# Patient Record
Sex: FEMALE | URBAN - METROPOLITAN AREA
[De-identification: names, ages, dates, MRNs, and addresses within clinical notes are randomized per-mention and may not be internally consistent; named-entity substitution may affect disease eponyms.]

---

## 2020-12-03 ENCOUNTER — VIRTUAL VISIT (OUTPATIENT)
Dept: FAMILY MEDICINE CLINIC | Age: 45
End: 2020-12-03
Payer: MEDICARE

## 2020-12-03 DIAGNOSIS — F51.01 PRIMARY INSOMNIA: ICD-10-CM

## 2020-12-03 DIAGNOSIS — J02.9 SORE THROAT: Primary | ICD-10-CM

## 2020-12-03 DIAGNOSIS — Z94.0 KIDNEY TRANSPLANT RECIPIENT: ICD-10-CM

## 2020-12-03 DIAGNOSIS — G89.29 OTHER CHRONIC PAIN: ICD-10-CM

## 2020-12-03 DIAGNOSIS — Z76.89 ENCOUNTER TO ESTABLISH CARE: ICD-10-CM

## 2020-12-03 DIAGNOSIS — F33.0 MILD EPISODE OF RECURRENT MAJOR DEPRESSIVE DISORDER (HCC): ICD-10-CM

## 2020-12-03 DIAGNOSIS — N31.9 NEUROGENIC BLADDER: ICD-10-CM

## 2020-12-03 PROCEDURE — 99203 OFFICE O/P NEW LOW 30 MIN: CPT | Performed by: NURSE PRACTITIONER

## 2020-12-03 PROCEDURE — G0463 HOSPITAL OUTPT CLINIC VISIT: HCPCS | Performed by: NURSE PRACTITIONER

## 2020-12-03 RX ORDER — DULOXETIN HYDROCHLORIDE 60 MG/1
120 CAPSULE, DELAYED RELEASE ORAL
COMMUNITY
Start: 2020-11-16

## 2020-12-03 RX ORDER — OMEPRAZOLE 20 MG/1
CAPSULE, DELAYED RELEASE ORAL
COMMUNITY
Start: 2020-09-25

## 2020-12-03 RX ORDER — ATORVASTATIN CALCIUM 10 MG/1
20 TABLET, FILM COATED ORAL
COMMUNITY
Start: 2020-10-19

## 2020-12-03 RX ORDER — TRAZODONE HYDROCHLORIDE 100 MG/1
12.5 TABLET ORAL
COMMUNITY
End: 2022-11-03

## 2020-12-03 RX ORDER — METHENAMINE HIPPURATE 1000 MG/1
1 TABLET ORAL DAILY
COMMUNITY
End: 2020-12-03 | Stop reason: SDUPTHER

## 2020-12-03 RX ORDER — TIZANIDINE 2 MG/1
TABLET ORAL
COMMUNITY
Start: 2020-10-31

## 2020-12-03 RX ORDER — MYCOPHENOLATE MOFETIL 250 MG/1
750 CAPSULE ORAL DAILY
COMMUNITY

## 2020-12-03 RX ORDER — PREDNISONE 5 MG/1
40 TABLET ORAL
COMMUNITY
Start: 2020-10-13

## 2020-12-03 RX ORDER — GABAPENTIN 300 MG/1
CAPSULE ORAL
COMMUNITY
Start: 2020-11-23

## 2020-12-03 RX ORDER — BUSPIRONE HYDROCHLORIDE 10 MG/1
10 TABLET ORAL AS NEEDED
COMMUNITY

## 2020-12-03 RX ORDER — DULOXETIN HYDROCHLORIDE 30 MG/1
CAPSULE, DELAYED RELEASE ORAL
COMMUNITY
End: 2020-12-03

## 2020-12-03 RX ORDER — MYCOPHENOLATE MOFETIL 500 MG/1
750 TABLET ORAL 2 TIMES DAILY
COMMUNITY
End: 2020-12-03

## 2020-12-03 RX ORDER — CHOLECALCIFEROL (VITAMIN D3) 125 MCG
5000 CAPSULE ORAL DAILY
COMMUNITY

## 2020-12-03 RX ORDER — OXYBUTYNIN CHLORIDE 5 MG/1
TABLET ORAL
COMMUNITY

## 2020-12-03 RX ORDER — METHENAMINE HIPPURATE 1000 MG/1
1 TABLET ORAL DAILY
Qty: 30 TAB | Refills: 1 | Status: SHIPPED | OUTPATIENT
Start: 2020-12-03

## 2020-12-03 RX ORDER — CYCLOSPORINE 100 MG/1
CAPSULE, GELATIN COATED ORAL
COMMUNITY
End: 2021-06-25

## 2020-12-03 RX ORDER — FOLIC ACID 1 MG/1
TABLET ORAL
COMMUNITY

## 2020-12-03 NOTE — PROGRESS NOTES
Chief Complaint   Patient presents with    New Patient    Sore Throat     x 1 week- white spoke- has had strep 2 x this year already        1. Have you been to the ER, urgent care clinic since your last visit? Hospitalized since your last visit? No    2. Have you seen or consulted any other health care providers outside of the [address] [place] [address] since your last visit? Include any pap smears or colon screening.  Appt trevor for Nephrology

## 2020-12-03 NOTE — PROGRESS NOTES
[address] [place] [address]               [phone]      Ru Nava is a 39 y.o. female who was seen by synchronous (real-time) audio-video technology on 12/3/2020. Consent: Ru Nava, who was seen by synchronous (real-time) audio-video technology, and/or her healthcare decision maker, is aware that this patient-initiated, Telehealth encounter on 12/3/2020 is a billable service, with coverage as determined by her insurance carrier. She is aware that she may receive a bill and has provided verbal consent to proceed: Yes. Assessment & Plan:   Diagnoses and all orders for this visit:    1. Sore throat  She endorses a sore throat, states that she has had strep throat twice already this year  Although I was able to vaguely see her tonsils on this video visit and there was a slight white spot I did not feel as though I could absolutely diagnose her with strep throat under the circumstances with her reported symptoms  Although I was willing to go ahead and treat her with penicillin p.o. she stated she would rather have a shot therefore she will go to an urgent care for further evaluation    2. Kidney transplant recipient  She received a kidney transplant in 2006, she has not yet established care with a new nephrologist    3. Other chronic pain  She has back pain related to her congenital spina bifida resulting in her having 1 leg 2 inches shorter than the other    4. Neurogenic bladder  -     methenamine hippurate (HIPREX) 1 gram tablet; Take 1 Tab by mouth daily. She has a neurogenic bladder related to her spina bifida, endorses straight cathing up to 5 times a day, states that this time she has no need for supplies    5. Mild episode of recurrent major depressive disorder [facility])  She has a history of depression she is currently managed through the [address] [place] [address]    6.  Primary insomnia  Endorses chronic insomnia which she will take trazodone for as needed, this is prescribed through the [address]    7. Encounter to establish care  Visit today to establish care, she recently moved here from [place] where her prior PCP was    Follow-up and Dispositions    · Return in about 3 months (around 3/3/2021) for HLD, HTN, 30 min, office only. 712  Subjective:   Yazan Ross is a 39 y.o. female who was seen for   New Patient; Sore Throat (x 1 week- white spoke- has had strep 2 x this year already ); and Annual Wellness Visit (is due)  Moved to this area in May 2020, used to live in [place]  She wanted out of the [place] area and wanted to be near the water and [employer] facility  Lives with her parents, has a god brother-he donated her his kidney    Sore throat  Onset: 1 week ago  Characteristics: can see white spots, her throat feels dry and looks swollen, no redness, swallows without difficulty, has sinus drainage that makes her nauseous. Denies fever or chills, slight difficulty swallowing. White spots are only on her throat  PMH: has had strep throat two times already this year    Hypertension:   Patient reports taking medications as instructed. N/A   Medication side effects noted. N/A  Headache upon wakening. N/A   Home BP monitoring in range of 139/96 at ED 9/2020    Do you experience chest pain/pressure or SOB with exertion? no  Maintain a low salt diet? yes  Key CAD CHF Meds             atorvastatin (LIPITOR) 10 mg tablet (Taking) TK 1 T PO HS            Prior to Admission medications    Medication Sig Start Date End Date Taking?  Authorizing Provider   cycloSPORINE (SandIMMUNE) 100 mg capsule cyclosporine 100 mg capsule   Yes Provider, Historical   gabapentin (NEURONTIN) 300 mg capsule TAKE 1 C PO QD 11/23/20  Yes Provider, Historical   oxybutynin (DITROPAN) 5 mg tablet oxybutynin chloride 5 mg tablet   TK 1 T PO TID   Yes Provider, Historical   omeprazole (PRILOSEC) 20 mg capsule TK 1 C PO D 9/25/20  Yes Provider, Historical cholecalciferol (VITAMIN D3) (5000 Units /125 mcg) capsule Take 5,000 Units by mouth daily. Yes Provider, Historical   atorvastatin (LIPITOR) 10 mg tablet TK 1 T PO HS 10/19/20  Yes Provider, Historical   busPIRone (BUSPAR) 10 mg tablet buspirone 10 mg tablet   Yes Provider, Historical   predniSONE (DELTASONE) 5 mg tablet TK 1 T PO D 10/13/20  Yes Provider, Historical   tiZANidine (ZANAFLEX) 2 mg tablet TK 1 T PO HS PRF MUSCLE SPASM 10/31/20  Yes Provider, Historical   traZODone (DESYREL) 100 mg tablet trazodone 100 mg tablet   Yes Provider, Historical   folic acid (FOLVITE) 1 mg tablet folic acid 1 mg tablet   TK 1 T PO D   Yes Provider, Historical   DULoxetine (CYMBALTA) 60 mg capsule Take 120 mg by mouth nightly. 11/16/20  Yes Provider, Historical   mycophenolate mofetil (CellCept) 250 mg capsule Take 750 mg by mouth daily. Yes Provider, Historical   methenamine hippurate (HIPREX) 1 gram tablet Take 1 Tab by mouth daily. 12/3/20  Yes Buffy Abbott, MILVIA   mycophenolate (CELLCEPT) 500 mg tablet Take 750 mg by mouth two (2) times a day. 12/3/20  Provider, Historical   DULoxetine (CYMBALTA) 30 mg capsule duloxetine 30 mg capsule,delayed release   Take 1 capsule twice a day by oral route as directed for 30 days. 12/3/20  Provider, Historical   methenamine hippurate (HIPREX) 1 gram tablet Take 1 g by mouth daily.   12/3/20  Provider, Historical     No Known Allergies    Patient Active Problem List   Diagnosis Code    Kidney transplant recipient Z94.0    Chronic pain G89.29    Hypercholesterolemia E78.00    Hypertension I10    History of spina bifida Z87.728    Edentulous K80.80    Mild episode of recurrent major depressive disorder ([facility] [address] F33.0    Primary insomnia F51.01     Past Surgical History:   Procedure Laterality Date    HX ORTHOPAEDIC      left ankle    HX TRANSPLANT      kidney    HX UROLOGICAL      ureter implants     Family History   Problem Relation Age of Onset    Diabetes Mother  Lung Disease Mother     COPD Mother     Diabetes Father     Hypertension Father      Social History     Tobacco Use    Smoking status: Never Smoker   Substance Use Topics    Alcohol use: Yes       Review of Systems   Respiratory: Negative. Cardiovascular:        Last b/p   Gastrointestinal: Negative. Genitourinary:        Neurogenic bladder, she self caths, 3-5 times a day   Musculoskeletal:        Left leg 2 inches shorter, uses walker and has a scooter   Psychiatric/Behavioral: Positive for depression. Negative for substance abuse and suicidal ideas. The patient has insomnia. As stated in HPI, otherwise all others negative. Objective: There were no vitals taken for this visit. General: alert, cooperative, no distress   Mental  status: normal mood, behavior, speech, dress, motor activity, and thought processes, able to follow commands   HENT: NCAT   Neck: no visualized mass   Resp: no respiratory distress   Neuro: no gross deficits   Skin: no discoloration or lesions of concern on visible areas   Psychiatric: normal affect, consistent with stated mood, no evidence of hallucinations     Additional exam findings: able to visualize a white lesion on her tonsils      We discussed the expected course, resolution and complications of the diagnosis(es) in detail. Medication risks, benefits, costs, interactions, and alternatives were discussed as indicated. I advised her to contact the office if her condition worsens, changes or fails to improve as anticipated. She expressed understanding with the diagnosis(es) and plan. Cherie Luciano is a 39 y.o. female who was evaluated by a video visit encounter for concerns as above. Patient identification was verified prior to start of the visit. A caregiver was present when appropriate.  Due to this being a TeleHealth encounter (During [place] [address] public [place] emergency), evaluation of the following organ systems was limited: Vitals/Constitutional/EENT/Resp/CV/GI//MS/Neuro/Skin/Heme-Lymph-Imm. Pursuant to the emergency declaration under the [facility], [facility] waiver authority and the [employer] Act, this Virtual  Visit was conducted, with patient's (and/or legal guardian's) consent, to reduce the patient's risk of exposure to COVID-19 and provide necessary medical care. Services were provided through a video synchronous discussion virtually to substitute for in-person clinic visit. Patient and provider were located at their individual homes. An After Visit Summary was printed and given to the patient. All diagnosis have been discussed with the patient and all of the patient's questions have been answered. Follow-up and Dispositions    · Return in about 3 months (around 3/3/2021) for HLD, HTN, 30 min, office only. Jessica Bojorquez, [place] [id number] [address]   Ashely Cochran [address]

## 2021-03-16 ENCOUNTER — PATIENT OUTREACH (OUTPATIENT)
Dept: CASE MANAGEMENT | Age: 46
End: 2021-03-16

## 2021-03-16 RX ORDER — TACROLIMUS 1 MG/1
2 TABLET, EXTENDED RELEASE ORAL DAILY
COMMUNITY
End: 2021-06-30

## 2021-03-16 NOTE — PROGRESS NOTES
Complex Case Management      Date/Time:  3/16/2021 4:26 PM    Method of communication with patient:phone    [facility] ([facility]) contacted the patient by telephone to perform Ambulatory Care Coordination. Verified name and  (PHI) with patient as identifiers. Provided introduction to self, and explanation of the Ambulatory Care Manager's role. Reviewed most recent clinic visit w/ patient who verbalized understanding. Patient given an opportunity to ask questions. Top Challenges reviewed with the patient   1. Has been unable to contact pain management for an appointment - states she will call again today. [facility] has offered to call office if she has difficulty contacting anyone. 2. States she needs some assistance with Medicaid. Is agreeable to speaking to . [facility] will contact . The patient agrees to contact the PCP office or the [facility] for questions related to their healthcare. Provided contact information for future reference. Disease Specific:   N/A    Home Health Active: No    DME Active: yes, supplies to straight cath    Barriers to care? Utilization of services, support system     States that she is mother's primary caregiver (bilateral amputee), lives in home with both parents    Advance Care Planning:   Does patient have an Advance Directive:  not on file; education provided     Medication(s):   Medication reconciliation was performed with patient, who verbalizes understanding of administration of home medications. There were no barriers to obtaining medications identified at this time. States that she is having some GI issues since beginning Envarsus. Referral to Pharm D needed: no     Current Outpatient Medications   Medication Sig    tacrolimus (Envarsus XR) 1 mg Tb24 Take 2 mg by mouth daily.  Indications: prevent kidney transplant rejection    gabapentin (NEURONTIN) 300 mg capsule TAKE 1 C PO QD    oxybutynin (DITROPAN) 5 mg tablet oxybutynin chloride 5 mg tablet   TK 1 T PO TID    omeprazole (PRILOSEC) 20 mg capsule TK 1 C PO D    atorvastatin (LIPITOR) 10 mg tablet TK 1 T PO HS    busPIRone (BUSPAR) 10 mg tablet 10 mg as needed.  predniSONE (DELTASONE) 5 mg tablet TK 1 T PO D    tiZANidine (ZANAFLEX) 2 mg tablet TK 1 T PO HS PRF MUSCLE SPASM    traZODone (DESYREL) 100 mg tablet 25 mg nightly as needed.  folic acid (FOLVITE) 1 mg tablet folic acid 1 mg tablet   TK 1 T PO D    DULoxetine (CYMBALTA) 60 mg capsule Take 120 mg by mouth nightly.  mycophenolate mofetil (CellCept) 250 mg capsule Take 750 mg by mouth daily.  methenamine hippurate (HIPREX) 1 gram tablet Take 1 Tab by mouth daily.  cycloSPORINE (SandIMMUNE) 100 mg capsule cyclosporine 100 mg capsule    cholecalciferol (VITAMIN D3) (5000 Units /125 mcg) capsule Take 5,000 Units by mouth daily. No current facility-administered medications for this visit. [facility] follow up appointment(s): No future appointments. Patient informed that last PCP appointment in December, AVS states she is to schedule follow up \"in 3 months\", patient instructed to contact office and schedule appointment. Verbalized understanding. Non-[facility] follow up appointment(s): [facility] - 3/25/21    Goals Addressed                 This Visit's Progress     Attends follow up appointments as scheduled        Nephrologist -  Dr. Miriam Wang - in April  Pain management - Dr. Trevon Ramirez - attempting to get appointment  PCP - NP Vani Karimi - no appointment scheduled            Reports that she has signed both herself and her father up for COVID-19 vaccinations. Mother was vaccinated while in rehab.

## 2021-03-17 ENCOUNTER — PATIENT OUTREACH (OUTPATIENT)
Dept: CASE MANAGEMENT | Age: 46
End: 2021-03-17

## 2021-03-17 NOTE — PROGRESS NOTES
Social Work Note  3/17/2021    Date of referral: 3/16/2021  Referral received from: DIOMEDES Rodriguez RN  Reason for referral: Assist the patient with community resources to obtain Medicaid and to address any other needs identified.     Previous SW Referral:  no   If yes, brief summary and outcome:  n/a     Support System: Family     Community Providers: unknown     Transportation: unknown     Medication:unknown     Financial Concern: unknown     Advance Care Plan:  not on file      Other: n/a     Identified Needs:      · Patient needs assistance with finding a pro porfirio  to help with the trust on her parent's home.      Social Work Plan:     Research and provide the patient with list of pro porfirio attorneys in [facility] to assist with the trus of her parent's home.      MSW received referral to assist the patient with resources to obtain Medicaid and to address any other needs identified. MSW contacted the patient who verified her name and  as identifiers. MSW introduced self, role and reason for call. The patient is a 39year old female who currently resides in the community with her parents and god brother. She lived in [place] prior to recent relocation. The patient is alert and oriented and an active participant in the conversation. The patient stated that she is in need of assistance with changing the trust to her parent's house in order for her mother to qualify for Medicaid. The patient shared that she relocated here from [place] to be closer to her parents and god brother to be of assistance to them. MSW will research pro porfirio attorneys in [facility] to provide to the patient. MSW will follow to assist as needed.

## 2021-03-17 NOTE — PROGRESS NOTES
Social Work Note  3/17/2021      Date of referral: 3/16/2021  Referral received from: DIOMEDES Crain RN  Reason for referral: Assist the patient with community resources to obtain Medicaid and to address any other needs identified. Previous SW Referral:  no   If yes, brief summary and outcome:  n/a    Support System: Family    Community Providers: unknown    Transportation: unknown    Medication:unknown    Financial Concern: unknown    Advance Care Plan:  not on file     Other: n/a    Identified Needs:      To be determined    Social Work Plan:     Assist the patient with any needs identified. MSW received referral to assist the patient with resources to obtain Medicaid and to address any other needs identified. MSW contacted the patient who reported that she was in the midst of assisting the physical therapist with her mother. The patient asked that MSW call back in one hour. MSW agreed to call back as instructed by the patient.

## 2021-03-25 ENCOUNTER — PATIENT OUTREACH (OUTPATIENT)
Dept: CASE MANAGEMENT | Age: 46
End: 2021-03-25

## 2021-03-25 RX ORDER — BUTALBITAL, ACETAMINOPHEN AND CAFFEINE 300; 40; 50 MG/1; MG/1; MG/1
CAPSULE ORAL
COMMUNITY
End: 2021-06-25

## 2021-03-25 NOTE — PROGRESS NOTES
Complex Case Management      Date/Time:  3/25/2021 3:32 PM    Method of communication with patient:phone    [facility] ([facility]) contacted the patient by telephone to perform Ambulatory Care Coordination. Verified name and  (PHI) with patient as identifiers. Provided introduction to self, and explanation of the Ambulatory Care Manager's role. The patient agrees to contact the PCP office or the [facility] for questions related to their healthcare. Provided contact information for future reference. Medication(s):   Medication reconciliation was performed with patient, who verbalizes understanding of administration of home medications. There were no barriers to obtaining medications identified at this time. [facility]G follow up appointment(s): No future appointments. Non-[facility] follow up appointment(s): see below    Goals Addressed                 This Visit's Progress     Attends follow up appointments as scheduled        Nephrologist -  Dr. Jaguar Arias - in April  Pain management - Dr. Lina Jarquin - attempting to get appointment  PCP - NP Xochitl Huertas - no appointment scheduled      3/25/21  Pain management scheduled for 3/29/21  PCP - to call back with appointment time and date          Patient recently received Pneumococcal vaccine and will be getting COVID-19 vaccine in approximately 2 weeks.

## 2021-03-31 ENCOUNTER — PATIENT OUTREACH (OUTPATIENT)
Dept: CASE MANAGEMENT | Age: 46
End: 2021-03-31

## 2021-03-31 NOTE — PROGRESS NOTES
Social Work Note  3/31/2021    Date of referral: 3/16/2021  Referral received from: DIOMEDES Packer RN  Reason for referral: Assist the patient with community resources to obtain Medicaid and to address any other needs identified.     Previous SW Referral:  no   If yes, brief summary and outcome:  n/a     Support System: Family     Community Providers: unknown     Transportation: unknown     Medication:unknown     Financial Concern: unknown     Advance Care Plan:  not on file      Other: n/a     Identified Needs:      · Patient needs assistance with finding a pro porfirio  to help with the trust on her parent's home.      Social Work Plan:     Research and provide the patient with list of pro porfirio attorneys in [place] to assist with the trust of her parent's home.      MSW received referral to assist the patient with resources to obtain Medicaid and to address any other needs identified. In speaking with the patient, she is seeking Medicaid for her mother and not for herself. She is requesting assistance to with locating pro-porfirio attorneys to assist her with the trust of her parent's home.      MSW contacted the patient who verified her name and  as identifiers. MSW introduced self, role and reason for call. MSW shared with the patient that contact have been made to some attorneys listed under pro-porfirio on website. [facility] offices confirms that they do not provide pro-porfirio services as indicated. The patient is open to MSW continuing research for pro-porfirio attorneys in the area. MSW will follow to assist as needed.

## 2021-04-05 ENCOUNTER — PATIENT OUTREACH (OUTPATIENT)
Dept: CASE MANAGEMENT | Age: 46
End: 2021-04-05

## 2021-04-05 RX ORDER — CETIRIZINE HCL 10 MG
TABLET ORAL
COMMUNITY

## 2021-04-05 NOTE — PROGRESS NOTES
Complex Case Management      Date/Time:  2021 3:26 PM    Method of communication with patient:phone    [address] [facility] ([facility]) contacted the patient by telephone to perform Ambulatory Care Coordination. Verified name and  (PHI) with patient as identifiers. Provided introduction to self, and explanation of the Ambulatory Care Manager's role. Reviewed most recent clinic visit w/ patient who verbalized understanding. Patient given an opportunity to ask questions. The patient agrees to contact the PCP office or the [address] [place] [address] for questions related to their healthcare. Provided contact information for future reference. Advance Care Planning:   Does patient have an Advance Directive:  not on file; education provided, patient states she is not yet ready to discuss ACP. Medication(s):   Medication reconciliation was performed with patient, who verbalizes understanding of administration of home medications. There were no barriers to obtaining medications identified at this time. Referral to Pharm D needed: no     Current Outpatient Medications   Medication Sig    cetirizine (ZyrTEC) 10 mg tablet Take  by mouth.  butalbital-acetaminophen-caff (Fioricet) -40 mg per capsule Take  by mouth every four (4) hours as needed for Headache.  tacrolimus (Envarsus XR) 1 mg Tb24 Take 2 mg by mouth daily. Indications: prevent kidney transplant rejection    gabapentin (NEURONTIN) 300 mg capsule TAKE 1 C PO QD    oxybutynin (DITROPAN) 5 mg tablet oxybutynin chloride 5 mg tablet   TK 1 T PO TID    omeprazole (PRILOSEC) 20 mg capsule TK 1 C PO D    atorvastatin (LIPITOR) 10 mg tablet TK 1 T PO HS    predniSONE (DELTASONE) 5 mg tablet TK 1 T PO D    tiZANidine (ZANAFLEX) 2 mg tablet TK 1 T PO HS PRF MUSCLE SPASM    folic acid (FOLVITE) 1 mg tablet folic acid 1 mg tablet   TK 1 T PO D    DULoxetine (CYMBALTA) 60 mg capsule Take 120 mg by mouth nightly.     mycophenolate mofetil (CellCept) 250 mg capsule Take 750 mg by mouth daily.  methenamine hippurate (HIPREX) 1 gram tablet Take 1 Tab by mouth daily.  cycloSPORINE (SandIMMUNE) 100 mg capsule cyclosporine 100 mg capsule    cholecalciferol (VITAMIN D3) (5000 Units /125 mcg) capsule Take 5,000 Units by mouth daily.  busPIRone (BUSPAR) 10 mg tablet 10 mg as needed.  traZODone (DESYREL) 100 mg tablet 25 mg nightly as needed. No current facility-administered medications for this visit. BSMG follow up appointment(s): No future appointments. States she will call and schedule PCP appointment.      Non-[facility] follow up appointment(s): [facility] on 5/27/21    Goals Addressed                 This Visit's Progress     Attends follow up appointments as scheduled   On track     Nephrologist -  Dr. Elsie Lazo - in April  Pain management - Dr. Julián Acevedo - attempting to get appointment  PCP - MILVIA Albrecht - no appointment scheduled      3/25/21  Pain management scheduled for 3/29/21  PCP - to call back with appointment time and date

## 2021-04-19 ENCOUNTER — PATIENT OUTREACH (OUTPATIENT)
Dept: CASE MANAGEMENT | Age: 46
End: 2021-04-19

## 2021-04-19 NOTE — PROGRESS NOTES
Complex Case Management      Date/Time:  2021 2:57PM    Method of communication with patient:phone    Ambulator Care Manager (ACM) contacted the patient by telephone to perform Ambulatory Care Coordination. Verified name and  (PHI) with patient as identifiers. Provided introduction to self, and explanation of the Ambulatory Care Manager's role. Patient requests that ACM call back tomorrow as she is in pain management office at this time.

## 2021-04-20 ENCOUNTER — PATIENT OUTREACH (OUTPATIENT)
Dept: CASE MANAGEMENT | Age: 46
End: 2021-04-20

## 2021-04-20 NOTE — PROGRESS NOTES
Social Work Note  2021    Date of referral: 3/16/2021  Referral received from: DIOMEDES Bee RN  Reason for referral: Assist the patient with community resources to obtain Medicaid and to address any other needs identified.     Previous SW Referral:  no   If yes, brief summary and outcome:  n/a     Support System: Family     Community Providers: unknown     Transportation: unknown     Medication:unknown     Financial Concern: unknown     Advance Care Plan:  not on file      Other: n/a     Identified Needs:      · Patient needs assistance with finding a pro porfirio  to help with the trust on her parent's home.      Social Work Plan:     Research and provide the patient with list of pro porfirio attorneys in [facility] to assist with the trust of her parent's home.      MSW received referral to assist the patient with resources to obtain Medicaid and to address any other needs identified. In speaking with the patient, she is seeking Medicaid for her mother and not for herself. She is requesting assistance with locating pro-porfirio attorneys to assist her with the trust of her parent's home. MSW contacted the patient who verified her name and  as identifiers. MSW introduced self, role and reason for call. MSW provided the patient with contact information to [address] [place] [address] which provides [place] services. .  MSW contacted the office and was provided with their Intake Services information. MSW informed the patient that the agency will need to do an assessment to determine whether they will be able to provide her with the services she is seeking. The patient reported that she was unaware that her parents had reversed mortgage on the house. She informed the MSW that because she is handicapped, she received information on what she will need to submit in order to be able to keep the house. MSW will follow patient for updates and to assist as needed.

## 2021-04-21 ENCOUNTER — PATIENT OUTREACH (OUTPATIENT)
Dept: CASE MANAGEMENT | Age: 46
End: 2021-04-21

## 2021-04-21 NOTE — PROGRESS NOTES
Complex Case Management      Date/Time:  2021 11:41 AM    Method of communication with patient:phone    [facility] ([facility]) contacted the patient by telephone to perform Ambulatory Care Coordination. Verified name and  (PHI) with patient as identifiers. Provided introduction to self, and explanation of the Ambulatory Care Manager's role. Reviewed most recent clinic visit w/ patient who verbalized understanding. Patient given an opportunity to ask questions. The patient agrees to contact the PCP office or the [facility] for questions related to their healthcare. Provided contact information for future reference. Medication(s):   Medication reconciliation was performed with patient, who verbalizes understanding of administration of home medications. There were no barriers to obtaining medications identified at this time. Referral to Pharm D needed: no     Current Outpatient Medications   Medication Sig    cetirizine (ZyrTEC) 10 mg tablet Take  by mouth.  tacrolimus (Envarsus XR) 1 mg Tb24 Take 2 mg by mouth daily. Indications: prevent kidney transplant rejection    gabapentin (NEURONTIN) 300 mg capsule TAKE 1 C PO QD    oxybutynin (DITROPAN) 5 mg tablet oxybutynin chloride 5 mg tablet   TK 1 T PO TID    omeprazole (PRILOSEC) 20 mg capsule TK 1 C PO D    atorvastatin (LIPITOR) 10 mg tablet TK 1 T PO HS    predniSONE (DELTASONE) 5 mg tablet TK 1 T PO D    tiZANidine (ZANAFLEX) 2 mg tablet TK 1 T PO HS PRF MUSCLE SPASM    folic acid (FOLVITE) 1 mg tablet folic acid 1 mg tablet   TK 1 T PO D    DULoxetine (CYMBALTA) 60 mg capsule Take 120 mg by mouth nightly.  mycophenolate mofetil (CellCept) 250 mg capsule Take 750 mg by mouth daily.  methenamine hippurate (HIPREX) 1 gram tablet Take 1 Tab by mouth daily.  butalbital-acetaminophen-caff (Fioricet) -40 mg per capsule Take  by mouth every four (4) hours as needed for Headache.     cycloSPORINE (SandIMMUNE) 100 mg capsule cyclosporine 100 mg capsule    cholecalciferol (VITAMIN D3) (5000 Units /125 mcg) capsule Take 5,000 Units by mouth daily.  busPIRone (BUSPAR) 10 mg tablet 10 mg as needed.  traZODone (DESYREL) 100 mg tablet 25 mg nightly as needed. No current facility-administered medications for this visit. [facility] follow up appointment(s): No future appointments.      Non-[facility] follow up appointment(s): NA    Goals Addressed                 This Visit's Progress     Attends follow up appointments as scheduled   On track     Nephrologist -  Dr. Adrian Astudillo - in April  Pain management - Dr. Subhash Hayes - attempting to get appointment  PCP - NP Dora Ulloa - no appointment scheduled      3/25/21  Pain management scheduled for 3/29/21  PCP - to call back with appointment time and date    4/21/21  Attended appointments with  Nephrology on 4/7/21  Pain Management on 4/21/21

## 2021-05-07 ENCOUNTER — PATIENT OUTREACH (OUTPATIENT)
Dept: CASE MANAGEMENT | Age: 46
End: 2021-05-07

## 2021-05-07 RX ORDER — HYDROCODONE BITARTRATE AND ACETAMINOPHEN 5; 300 MG/1; MG/1
TABLET ORAL
COMMUNITY

## 2021-05-07 NOTE — PROGRESS NOTES
Complex Case Management      Date/Time:  2021 2:17 PM    Method of communication with patient:phone    [address] [place] [address] ([facility]) contacted the patient by telephone to perform Ambulatory Care Coordination. Verified name and  (PHI) with patient as identifiers. Provided introduction to self, and explanation of the Ambulatory Care Manager's role. Top Challenges reviewed with the patient   1. States she received second COVID vaccination on 21 and is feeling very tired today. ACM instructed her to rest and stay hydrated, verbalizes understanding. The patient agrees to contact the PCP office or the [address] [place] [address] for questions related to their healthcare. Provided contact information for future reference. Medication(s):   Medication reconciliation was performed with patient, who verbalizes understanding of administration of home medications. There were no barriers to obtaining medications identified at this time. Referral to Pharm D needed: no     Current Outpatient Medications   Medication Sig    HYDROcodone-acetaminophen (XODOL) 5-300 mg tablet Take  by mouth two (2) times daily as needed for Pain.  cetirizine (ZyrTEC) 10 mg tablet Take  by mouth.  tacrolimus (Envarsus XR) 1 mg Tb24 Take 2 mg by mouth daily. Indications: prevent kidney transplant rejection    gabapentin (NEURONTIN) 300 mg capsule TAKE 1 C PO QD    oxybutynin (DITROPAN) 5 mg tablet oxybutynin chloride 5 mg tablet   TK 1 T PO TID    omeprazole (PRILOSEC) 20 mg capsule TK 1 C PO D    atorvastatin (LIPITOR) 10 mg tablet TK 1 T PO HS    predniSONE (DELTASONE) 5 mg tablet TK 1 T PO D    tiZANidine (ZANAFLEX) 2 mg tablet TK 1 T PO HS PRF MUSCLE SPASM    folic acid (FOLVITE) 1 mg tablet folic acid 1 mg tablet   TK 1 T PO D    DULoxetine (CYMBALTA) 60 mg capsule Take 120 mg by mouth nightly.  mycophenolate mofetil (CellCept) 250 mg capsule Take 750 mg by mouth daily.     methenamine hippurate (HIPREX) 1 gram tablet Take 1 Tab by mouth daily.  butalbital-acetaminophen-caff (Fioricet) -40 mg per capsule Take  by mouth every four (4) hours as needed for Headache.  cycloSPORINE (SandIMMUNE) 100 mg capsule cyclosporine 100 mg capsule    cholecalciferol (VITAMIN D3) (5000 Units /125 mcg) capsule Take 5,000 Units by mouth daily.  busPIRone (BUSPAR) 10 mg tablet 10 mg as needed.  traZODone (DESYREL) 100 mg tablet 25 mg nightly as needed. No current facility-administered medications for this visit. [facility] follow up appointment(s): No future appointments.      Non-[facility] follow up appointment(s): NA    Goals Addressed                 This Visit's Progress     Attends follow up appointments as scheduled   No change     Nephrologist -  Dr. Nayeli Mendoza - in April  Pain management - Dr. Juan José Ramirez - attempting to get appointment  PCP - MILVIA Wallace - no appointment scheduled      3/25/21  Pain management scheduled for 3/29/21  PCP - to call back with appointment time and date    4/21/21  Attended appointments with  Nephrology on 4/7/21  Pain Management on 4/21/21 5/7/21  Has not scheduled appointment with PCP, states she will do so next week

## 2021-05-12 ENCOUNTER — PATIENT OUTREACH (OUTPATIENT)
Dept: CASE MANAGEMENT | Age: 46
End: 2021-05-12

## 2021-05-12 NOTE — PROGRESS NOTES
Social Work Note  2021      Date of referral: 3/16/2021  Referral received from: ACM - Darryle Lew, RN  Reason for referral: Assist the patient with community resources to obtain Medicaid and to address any other needs identified.     Previous SW Referral:  no   If yes, brief summary and outcome:  n/a     Support System: Family     Community Providers: unknown     Transportation: unknown     Medication:unknown     Financial Concern: unknown     Advance Care Plan:  not on file      Other: n/a     Identified Needs:      · Patient needs assistance with finding a pro jose e  to help with the trust on her parent's home.      Social Work Plan:     Research and provide the patient with list of pro jose e attorneys in [place] to assist with the trust of her parent's home.      MSW received referral to assist the patient with resources to obtain Medicaid and to address any other needs identified. In speaking with the patient, she is seeking Medicaid for her mother and not for herself. She is requesting assistance with locating pro-[place] attorneys to assist her with the trust of her parent's home.   MSW contacted the patient who verified her name and  as identifiers. MSW introduced self, role and reason for call. MSW provided the patient with contact information to [address] [place] [address] which provides [employer] services. .  The patient reported that she reached out to [address] [place] [address] and she was informed that due to her disability, she will be rewarded the house and does not need to complete a trust.  The patient indicated that she had no other issues or concerns at this time. MSW will discontinue any further follow up with the patient at this time. MSW will be available to assist with any future needs.

## 2021-05-25 ENCOUNTER — PATIENT OUTREACH (OUTPATIENT)
Dept: CASE MANAGEMENT | Age: 46
End: 2021-05-25

## 2021-05-25 NOTE — PROGRESS NOTES
Complex Case Management      Date/Time:  2021 11:27 AM    Method of communication with patient:phone    [facility] ([facility]) contacted the patient by telephone to perform Ambulatory Care Coordination. Verified name and  (PHI) with patient as identifiers. Provided introduction to self, and explanation of the Ambulatory Care Manager's role. Reviewed most recent clinic visit w/ patient who verbalized understanding. Patient given an opportunity to ask questions. The patient agrees to contact the PCP office or the [address] [place] [address] for questions related to their healthcare. Provided contact information for future reference. Medication(s):   Medication reconciliation was performed with patient, who verbalizes understanding of administration of home medications. There were no barriers to obtaining medications identified at this time. Referral to Pharm D needed: no     Current Outpatient Medications   Medication Sig    HYDROcodone-acetaminophen (XODOL) 5-300 mg tablet Take  by mouth two (2) times daily as needed for Pain.  cetirizine (ZyrTEC) 10 mg tablet Take  by mouth.  gabapentin (NEURONTIN) 300 mg capsule TAKE 1 C PO QD    oxybutynin (DITROPAN) 5 mg tablet oxybutynin chloride 5 mg tablet   TK 1 T PO TID    omeprazole (PRILOSEC) 20 mg capsule TK 1 C PO D    atorvastatin (LIPITOR) 10 mg tablet TK 1 T PO HS    predniSONE (DELTASONE) 5 mg tablet TK 1 T PO D    tiZANidine (ZANAFLEX) 2 mg tablet TK 1 T PO HS PRF MUSCLE SPASM    folic acid (FOLVITE) 1 mg tablet folic acid 1 mg tablet   TK 1 T PO D    DULoxetine (CYMBALTA) 60 mg capsule Take 120 mg by mouth nightly.  mycophenolate mofetil (CellCept) 250 mg capsule Take 750 mg by mouth daily.  methenamine hippurate (HIPREX) 1 gram tablet Take 1 Tab by mouth daily.  butalbital-acetaminophen-caff (Fioricet) -40 mg per capsule Take  by mouth every four (4) hours as needed for Headache.  (Patient not taking: Reported on 5/25/2021)    tacrolimus (Envarsus XR) 1 mg Tb24 Take 2 mg by mouth daily. Indications: prevent kidney transplant rejection (Patient not taking: Reported on 5/25/2021)    cycloSPORINE (SandIMMUNE) 100 mg capsule cyclosporine 100 mg capsule (Patient not taking: Reported on 5/25/2021)    cholecalciferol (VITAMIN D3) (5000 Units /125 mcg) capsule Take 5,000 Units by mouth daily. (Patient not taking: Reported on 5/25/2021)    busPIRone (BUSPAR) 10 mg tablet 10 mg as needed. (Patient not taking: Reported on 5/25/2021)    traZODone (DESYREL) 100 mg tablet 25 mg nightly as needed. (Patient not taking: Reported on 5/25/2021)     No current facility-administered medications for this visit.        [facility] follow up appointment(s):   Future Appointments   Date Time Provider Bronson Palumbo   6/24/2021 11:15 AM Marisol Morin NP [facility]          Goals Addressed                 This Visit's Progress     Attends follow up appointments as scheduled   On track     Nephrologist -  Dr. Guy Frazier - in April  Pain management - Dr. Berhane Sanz - attempting to get appointment  PCP - MILVIA Espinal - no appointment scheduled      3/25/21  Pain management scheduled for 3/29/21  PCP - to call back with appointment time and date    4/21/21  Attended appointments with  Nephrology on 4/7/21  Pain Management on 4/21/21 5/7/21  Has not scheduled appointment with PCP, states she will do so next week    5/25/21  Has scheduled appointment with PCP  Nephrology appt in July  Pain management appt week of 6/1/21

## 2021-06-24 ENCOUNTER — VIRTUAL VISIT (OUTPATIENT)
Dept: FAMILY MEDICINE CLINIC | Age: 46
End: 2021-06-24

## 2021-06-24 NOTE — PROGRESS NOTES
1st attempt calling patient at 11:17 am for precheck in for appointment. Patient did not answer. Could not leave a message because patient phone has been disconnected and patient dose not have another contact number.

## 2021-06-30 PROBLEM — D50.9 IRON DEFICIENCY ANEMIA: Status: ACTIVE | Noted: 2017-11-03

## 2021-06-30 PROBLEM — R73.03 PREDIABETES: Status: ACTIVE | Noted: 2020-03-26

## 2021-06-30 PROBLEM — E55.9 VITAMIN D DEFICIENCY: Status: ACTIVE | Noted: 2017-10-27

## 2021-07-02 ENCOUNTER — PATIENT OUTREACH (OUTPATIENT)
Dept: CASE MANAGEMENT | Age: 46
End: 2021-07-02

## 2021-07-02 NOTE — PROGRESS NOTES
7/2/21  2:35 PM    Patient has graduated from the Complex Case Management  program on 7/2/21. Patient's symptoms are stable at this time. Patient/family has the ability to self-manage. Care management goals have been completed at this time. No further ACM follow up scheduled. Goals Addressed                 This Visit's Progress     COMPLETED: Attends follow up appointments as scheduled        Nephrologist -  Dr. Jose Nicole - in April  Pain management - Dr. Deedee Snellen - attempting to get appointment  PCP - MILVIA Blake - no appointment scheduled      3/25/21  Pain management scheduled for 3/29/21  PCP - to call back with appointment time and date    4/21/21  Attended appointments with  Nephrology on 4/7/21  Pain Management on 4/21/21 5/7/21  Has not scheduled appointment with PCP, states she will do so next week    5/25/21  Has scheduled appointment with PCP  Nephrology appt in July  Pain management appt week of 6/1/21            Pt has ACM's contact information for any further questions, concerns, or needs. Patients upcoming visits:  No future appointments.

## 2021-07-16 LAB — SARS-COV-2, NAA: NEGATIVE

## 2022-03-19 PROBLEM — E55.9 VITAMIN D DEFICIENCY: Status: ACTIVE | Noted: 2017-10-27

## 2022-03-19 PROBLEM — D50.9 IRON DEFICIENCY ANEMIA: Status: ACTIVE | Noted: 2017-11-03

## 2022-03-20 PROBLEM — R73.03 PREDIABETES: Status: ACTIVE | Noted: 2020-03-26

## 2022-05-07 LAB — SARS-COV-2, NAA: POSITIVE

## 2022-11-03 ENCOUNTER — OFFICE VISIT (OUTPATIENT)
Dept: FAMILY MEDICINE CLINIC | Age: 47
End: 2022-11-03
Payer: MEDICARE

## 2022-11-03 VITALS
DIASTOLIC BLOOD PRESSURE: 86 MMHG | HEART RATE: 76 BPM | TEMPERATURE: 98.4 F | HEIGHT: 61 IN | SYSTOLIC BLOOD PRESSURE: 136 MMHG | BODY MASS INDEX: 51.92 KG/M2 | RESPIRATION RATE: 18 BRPM | WEIGHT: 275 LBS | OXYGEN SATURATION: 96 %

## 2022-11-03 DIAGNOSIS — I10 PRIMARY HYPERTENSION: ICD-10-CM

## 2022-11-03 DIAGNOSIS — Z71.89 ADVANCED CARE PLANNING/COUNSELING DISCUSSION: ICD-10-CM

## 2022-11-03 DIAGNOSIS — F33.0 MILD EPISODE OF RECURRENT MAJOR DEPRESSIVE DISORDER (HCC): ICD-10-CM

## 2022-11-03 DIAGNOSIS — Z00.00 MEDICARE ANNUAL WELLNESS VISIT, SUBSEQUENT: Primary | ICD-10-CM

## 2022-11-03 DIAGNOSIS — Z12.31 SCREENING MAMMOGRAM, ENCOUNTER FOR: ICD-10-CM

## 2022-11-03 DIAGNOSIS — M25.511 ACUTE PAIN OF RIGHT SHOULDER: ICD-10-CM

## 2022-11-03 DIAGNOSIS — E78.00 HYPERCHOLESTEROLEMIA: ICD-10-CM

## 2022-11-03 PROCEDURE — G8427 DOCREV CUR MEDS BY ELIG CLIN: HCPCS | Performed by: NURSE PRACTITIONER

## 2022-11-03 PROCEDURE — G0439 PPPS, SUBSEQ VISIT: HCPCS | Performed by: NURSE PRACTITIONER

## 2022-11-03 PROCEDURE — G8432 DEP SCR NOT DOC, RNG: HCPCS | Performed by: NURSE PRACTITIONER

## 2022-11-03 PROCEDURE — 3075F SYST BP GE 130 - 139MM HG: CPT | Performed by: NURSE PRACTITIONER

## 2022-11-03 PROCEDURE — 3079F DIAST BP 80-89 MM HG: CPT | Performed by: NURSE PRACTITIONER

## 2022-11-03 PROCEDURE — 99214 OFFICE O/P EST MOD 30 MIN: CPT | Performed by: NURSE PRACTITIONER

## 2022-11-03 PROCEDURE — G8417 CALC BMI ABV UP PARAM F/U: HCPCS | Performed by: NURSE PRACTITIONER

## 2022-11-03 RX ORDER — CYCLOSPORINE 100 MG/1
25 CAPSULE, GELATIN COATED ORAL 2 TIMES DAILY
COMMUNITY

## 2022-11-03 NOTE — PROGRESS NOTES
This is the Subsequent Medicare Annual Wellness Exam, performed 12 months or more after the Initial AWV or the last Subsequent AWV    I have reviewed the patient's medical history in detail and updated the computerized patient record. Assessment/Plan   Education and counseling provided:  Are appropriate based on today's review and evaluation    1. Medicare annual wellness visit, subsequent  Completed today to include ETOH and depression screening     2. Advanced care planning/counseling discussion  Healthcare decision maker verified and ACP Discussion performed and documented in note     3. Primary hypertension  Endorses medication compliance, managed by nephrology, blood pressure stable  4. Hypercholesterolemia  Endorses medication compliance, managed by nephrology  5. Acute pain of right shoulder  -     REFERRAL TO ORTHOPEDICS  Since she has had this shoulder pain for several years, refer to ortho for further evaluation  6. Mild episode of recurrent major depressive disorder ([facility]  Managed by [facility]  7. Screening mammogram, encounter for  -     [facility] MAMMO BI SCREENING INCL CAD; Future     Had labs completed 10/3/22 at [facility], none needed today  Follow-up and Dispositions    Return in about 6 months (around 5/3/2023) for HTN, HLD, 15 min, office only. Right shoulder pain  Injured in 2006 after a fall  About a week ago the pain worsened after cleaning  [facility], had x-rays completed, told she had arthritis and was given prednisone  Continues to have pain in her right shoulder, has to move very slowly, cannot twist her arm w/o pain        Hypertension:  Visit Vitals  /86   Pulse 76   Temp 98.4 °F (36.9 °C) (Temporal)   Resp 18   Ht 5' 1\" (1.549 m)   Wt 275 lb (124.7 kg)   SpO2 96%   BMI 51.96 kg/m²      Patient reports taking medications as instructed. yes   Medication side effects noted. no  Headache upon wakening.  no   Home BP monitoring: Does not check  Do you experience chest pain/pressure or SOB with exertion? no  Maintain a low Sodium diet? no  Key CAD CHF Meds               cloNIDine HCL (CATAPRES) 0.1 mg tablet (Taking) TAKE 1 TABLET BY MOUTH EVERY DAY    atorvastatin (LIPITOR) 10 mg tablet (Taking) 20 mg. No results found for: BUN, CREA, GFRAA, K      HLD:  Has been compliant with meds  Yes  Compliant with low-fat diet. most of the time    Denies myalgias or other side effects. yes  The ASCVD Risk score (Gina AGGARWAL, et al., 2019) failed to calculate for the following reasons:    Cannot find a previous HDL lab    Cannot find a previous total cholesterol lab    Unable to determine if patient is Non-     No results found for: CHOL, TRIGL, HDL, LDLC]  Key Antihyperlipidemia Meds               atorvastatin (LIPITOR) 10 mg tablet (Taking) 20 mg. Depression Risk Factor Screening     3 most recent PHQ Screens 11/3/2022   Little interest or pleasure in doing things Several days   Feeling down, depressed, irritable, or hopeless Several days   Total Score PHQ 2 2       Alcohol & Drug Abuse Risk Screen    Do you average more than 1 drink per night or more than 7 drinks a week:  No    On any one occasion in the past three months have you have had more than 3 drinks containing alcohol:  No       Opioid Risk: (Low risk score <55, High risk score ?55)  Opioid risk score: 22      Click here to complete the Controlled Substance Monitoring SmartForm    Last PDMP Jay as Reviewed:  Review User Review Instant Review Result            Functional Ability and Level of Safety    Hearing: Hearing is good. Activities of Daily Living: The home contains: no safety equipment. Patient does total self care      Ambulation: with mild difficulty     Fall Risk:  Fall Risk Assessment, last 12 mths 12/3/2020   Able to walk? Yes   Fall in past 12 months?  Yes   Number of falls in past 12 months 4      Abuse Screen:  Patient is not abused Cognitive Screening    Has your family/caregiver stated any concerns about your memory: no     Cognitive Screening: Normal - Clock Drawing Test    Health Maintenance Due     Health Maintenance Due   Topic Date Due    Hepatitis C Screening  Never done    A1C test (Diabetic or Prediabetic)  Never done    Cervical cancer screen  Never done    Colorectal Cancer Screening Combo  Never done    COVID-19 Vaccine (4 - Booster for Pfizer series) 12/28/2021    Depression Monitoring  06/25/2022    Flu Vaccine (1) 08/01/2022       Patient Care Team   Patient Care Team:  Doretha Koroma NP as PCP - General (Nurse Practitioner)  Doretha Koroma NP as PCP - [facility] Empaneled Provider    History     Patient Active Problem List   Diagnosis Code    Kidney transplant recipient Z94.0    Chronic pain G89.29    Hypercholesterolemia E78.00    Hypertension I10    History of spina bifida Z87.728    Edentulous K08.109    Mild episode of recurrent major depressive disorder ([facility] F33.0    Primary insomnia F51.01    Iron deficiency anemia D50.9    Prediabetes R73.03    Vitamin D deficiency E55.9     Past Medical History:   Diagnosis Date    Chronic kidney disease     Chronic pain     Dialysis patient ([facility]     GERD (gastroesophageal reflux disease)     Hypercholesterolemia     Hypertension       Past Surgical History:   Procedure Laterality Date    HX ORTHOPAEDIC      left ankle    HX TRANSPLANT      kidney    HX UROLOGICAL      ureter implants     Current Outpatient Medications   Medication Sig Dispense Refill    cycloSPORINE (SandIMMUNE) 100 mg capsule Take 25 mg by mouth two (2) times a day. cloNIDine HCL (CATAPRES) 0.1 mg tablet TAKE 1 TABLET BY MOUTH EVERY DAY      HYDROcodone-acetaminophen (XODOL) 5-300 mg tablet Take  by mouth two (2) times daily as needed for Pain.       cetirizine (ZYRTEC) 10 mg tablet Take  by mouth.      gabapentin (NEURONTIN) 300 mg capsule TAKE 1 C PO QD      oxybutynin (DITROPAN) 5 mg tablet oxybutynin chloride 5 mg tablet   TK 1 T PO TID      omeprazole (PRILOSEC) 20 mg capsule TK 1 C PO D      cholecalciferol (VITAMIN D3) (5000 Units /125 mcg) capsule Take 5,000 Units by mouth daily. atorvastatin (LIPITOR) 10 mg tablet 20 mg.      busPIRone (BUSPAR) 10 mg tablet 10 mg as needed. predniSONE (DELTASONE) 5 mg tablet 40 mg.      tiZANidine (ZANAFLEX) 2 mg tablet TK 1 T PO HS PRF MUSCLE SPASM      folic acid (FOLVITE) 1 mg tablet folic acid 1 mg tablet   TK 1 T PO D      DULoxetine (CYMBALTA) 60 mg capsule Take 120 mg by mouth nightly. mycophenolate mofetil (CELLCEPT) 250 mg capsule Take 750 mg by mouth daily. methenamine hippurate (HIPREX) 1 gram tablet Take 1 Tab by mouth daily.  30 Tab 1     Allergies   Allergen Reactions    Iron Nausea and Vomiting     Only iron in pill form        Family History   Problem Relation Age of Onset    Diabetes Mother     Lung Disease Mother     COPD Mother     Diabetes Father     Hypertension Father      Social History     Tobacco Use    Smoking status: Never    Smokeless tobacco: Never   Substance Use Topics    Alcohol use: Yes     Comment: linsey Corral NP

## 2022-11-03 NOTE — ACP (ADVANCE CARE PLANNING)
Advance Care Planning     General Advance Care Planning (ACP) Conversation      Date of Conversation: 11/3/2022  Conducted with: Patient with Decision Making Capacity    Healthcare Decision Maker:     Primary Decision Maker: An [place] -  - [phone]  Click here to complete [address] including selection of the Healthcare Decision Maker Relationship (ie \"Primary\")    Today we documented Decision Maker(s) consistent with Legal Next of Kin hierarchy.     Content/Action Overview:   Has NO ACP documents/care preferences - information provided, considering goals and options  Reviewed DNR/DNI and patient elects Full Code (Attempt Resuscitation)  Topics discussed: resuscitation preferences       Length of Voluntary ACP Conversation in minutes:  <16 minutes (Non-Billable)    Viv Chin NP

## 2022-11-03 NOTE — PATIENT INSTRUCTIONS
Medicare Wellness Visit, Female     The best way to live healthy is to have a lifestyle where you eat a well-balanced diet, exercise regularly, limit alcohol use, and quit all forms of tobacco/nicotine, if applicable. Regular preventive services are another way to keep healthy. Preventive services (vaccines, screening tests, monitoring & exams) can help personalize your care plan, which helps you manage your own care. Screening tests can find health problems at the earliest stages, when they are easiest to treat. [facility] follows the current, evidence-based guidelines published by the [facility] ([facility]) when recommending preventive services for our patients. Because we follow these guidelines, sometimes recommendations change over time as research supports it. (For example, mammograms used to be recommended annually. Even though Medicare will still pay for an annual mammogram, the newer guidelines recommend a mammogram every two years for women of average risk). Of course, you and your doctor may decide to screen more often for some diseases, based on your risk and your co-morbidities (chronic disease you are already diagnosed with). Preventive services for you include:  - Medicare offers their members a free annual wellness visit, which is time for you and your primary care provider to discuss and plan for your preventive service needs. Take advantage of this benefit every year!  -All adults over the age of 72 should receive the recommended pneumonia vaccines. Current USPSTF guidelines recommend a series of two vaccines for the best pneumonia protection.   -All adults should have a flu vaccine yearly and a tetanus vaccine every 10 years.   -All adults age 48 and older should receive the shingles vaccines (series of two vaccines).       -All adults age 38-68 who are overweight should have a diabetes screening test once every three years.   -All adults born between [birth year] and 1965 should be screened once for Hepatitis C.  -Other screening tests and preventive services for persons with diabetes include: an eye exam to screen for diabetic retinopathy, a kidney function test, a foot exam, and stricter control over your cholesterol.   -Cardiovascular screening for adults with routine risk involves an electrocardiogram (ECG) at intervals determined by your doctor.   -Colorectal cancer screenings should be done for adults age 54-65 with no increased risk factors for colorectal cancer. There are a number of acceptable methods of screening for this type of cancer. Each test has its own benefits and drawbacks. Discuss with your doctor what is most appropriate for you during your annual wellness visit. The different tests include: colonoscopy (considered the best screening method), a fecal occult blood test, a fecal DNA test, and sigmoidoscopy.    -A bone mass density test is recommended when a woman turns 65 to screen for osteoporosis. This test is only recommended one time, as a screening. Some providers will use this same test as a disease monitoring tool if you already have osteoporosis. -Breast cancer screenings are recommended every other year for women of normal risk, age 54-69.  -Cervical cancer screenings for women over age 72 are only recommended with certain risk factors.      Here is a list of your current Health Maintenance items (your personalized list of preventive services) with a due date:  Health Maintenance Due   Topic Date Due    Hepatitis C Test  Never done    Hemoglobin A1C    Never done    Cervical cancer screen  Never done    Colorectal Screening  Never done    COVID-19 Vaccine (4 - Booster for Pfizer series) 12/28/2021    Depression Monitoring  06/25/2022    Yearly Flu Vaccine (1) 08/01/2022

## 2022-11-03 NOTE — PROGRESS NOTES
Room 8     When asked if patient has any concerns she would like to address with TIMA Jimenez patient states yes I would like to get a  referral for the Ortho due to reinjured rotator cuff  . Did patient bring someone? No    Did the patient have DME equipment? Yes Electric wheelchair     Did you take your medication today? Yes       1. \"Have you been to the ER, urgent care clinic since your last visit? Hospitalized since your last visit? \" No    2. \"Have you seen or consulted any other health care providers outside of the [address] [place] [address] since your last visit? \" No     3. For patients aged 39-70: Has the patient had a colonoscopy / FIT/ Cologuard? No patient refused       If the patient is female:    4. For patients aged 41-77: Has the patient had a mammogram within the past 2 years? Yes - no Care Gap present      5. For patients aged 21-65: Has the patient had a pap smear?  No Patient refused         3 most recent PHQ Screens 11/3/2022   Little interest or pleasure in doing things Several days   Feeling down, depressed, irritable, or hopeless Several days   Total Score PHQ 2 2         Health Maintenance Due   Topic Date Due    Hepatitis C Screening  Never done    A1C test (Diabetic or Prediabetic)  Never done    Cervical cancer screen  Never done    Colorectal Cancer Screening Combo  Never done    COVID-19 Vaccine (4 - Booster for Pfizer series) 12/28/2021    Depression Monitoring  06/25/2022    Medicare Yearly Exam  06/26/2022    Flu Vaccine (1) 08/01/2022       Learning Assessment 12/3/2020   PRIMARY LEARNER Patient   HIGHEST LEVEL OF EDUCATION - PRIMARY LEARNER  SOME COLLEGE   BARRIERS PRIMARY LEARNER NONE   CO-LEARNER CAREGIVER No   PRIMARY LANGUAGE ENGLISH   LEARNER PREFERENCE PRIMARY DEMONSTRATION   ANSWERED BY Celeste Bee   RELATIONSHIP SELF

## 2022-12-07 ENCOUNTER — OFFICE VISIT (OUTPATIENT)
Dept: ORTHOPEDIC SURGERY | Age: 47
End: 2022-12-07
Payer: MEDICARE

## 2022-12-07 VITALS
HEIGHT: 61 IN | OXYGEN SATURATION: 96 % | BODY MASS INDEX: 53.92 KG/M2 | HEART RATE: 108 BPM | RESPIRATION RATE: 16 BRPM | WEIGHT: 285.6 LBS

## 2022-12-07 DIAGNOSIS — G89.29 CHRONIC RIGHT SHOULDER PAIN: ICD-10-CM

## 2022-12-07 DIAGNOSIS — E66.01 MORBID OBESITY WITH BMI OF 50.0-59.9, ADULT (HCC): ICD-10-CM

## 2022-12-07 DIAGNOSIS — M25.511 CHRONIC RIGHT SHOULDER PAIN: ICD-10-CM

## 2022-12-07 DIAGNOSIS — M75.101 RIGHT ROTATOR CUFF TEAR ARTHROPATHY: ICD-10-CM

## 2022-12-07 DIAGNOSIS — Q65.89 CONGENITAL DYSPLASIA OF LEFT HIP: Primary | ICD-10-CM

## 2022-12-07 DIAGNOSIS — M12.811 RIGHT ROTATOR CUFF TEAR ARTHROPATHY: ICD-10-CM

## 2022-12-07 NOTE — PROGRESS NOTES
Patient: Brayden Hoskins                MRN: [id number]       SSN: [id number]  YOB: 1975        AGE: 52 y.o. SEX: female  Body mass index is 53.96 kg/m². PCP: Juliann James NP  12/07/22    CHIEF COMPLAINT: Right shoulder pain/dysfunction    HPI: Brayden Hoskins is a 52 y.o. female patient who presents to the office today with right shoulder pain. She also describes a shortened left leg due to hip dysplasia. For her right shoulder pain she has been having pain off and on for 15 to 16 years. She injured it initially at that time. More recently while wiping up a spill at work she noted increasing pain and dysfunction in her shoulder. This is gotten somewhat better. She was told 15 years ago that she had a rotator cuff tear. She did not have any surgery. At the time of the initial injury she had recently undergone a kidney transplant was focused on recovery from that. Past Medical History:   Diagnosis Date    Chronic kidney disease     Chronic pain     [facility]     GERD (gastroesophageal reflux disease)     Hypercholesterolemia     Hypertension        Family History   Problem Relation Age of Onset    Diabetes Mother     Lung Disease Mother     COPD Mother     Diabetes Father     Hypertension Father        Current Outpatient Medications   Medication Sig Dispense Refill    cycloSPORINE (SandIMMUNE) 100 mg capsule Take 25 mg by mouth two (2) times a day. cloNIDine HCL (CATAPRES) 0.1 mg tablet TAKE 1 TABLET BY MOUTH EVERY DAY      HYDROcodone-acetaminophen (XODOL) 5-300 mg tablet Take  by mouth two (2) times daily as needed for Pain.       cetirizine (ZYRTEC) 10 mg tablet Take  by mouth.      gabapentin (NEURONTIN) 300 mg capsule TAKE 1 C PO QD      oxybutynin (DITROPAN) 5 mg tablet oxybutynin chloride 5 mg tablet   TK 1 T PO TID      omeprazole (PRILOSEC) 20 mg capsule TK 1 C PO D      cholecalciferol (VITAMIN D3) (5000 Units /125 mcg) capsule Take 5,000 Units by mouth daily. atorvastatin (LIPITOR) 10 mg tablet 20 mg.      busPIRone (BUSPAR) 10 mg tablet 10 mg as needed. predniSONE (DELTASONE) 5 mg tablet 40 mg.      tiZANidine (ZANAFLEX) 2 mg tablet TK 1 T PO HS PRF MUSCLE SPASM      folic acid (FOLVITE) 1 mg tablet folic acid 1 mg tablet   TK 1 T PO D      DULoxetine (CYMBALTA) 60 mg capsule Take 120 mg by mouth nightly. mycophenolate mofetil (CELLCEPT) 250 mg capsule Take 750 mg by mouth daily. methenamine hippurate (HIPREX) 1 gram tablet Take 1 Tab by mouth daily.  30 Tab 1       Allergies   Allergen Reactions    Iron Nausea and Vomiting     Only iron in pill form        Past Surgical History:   Procedure Laterality Date    HX ORTHOPAEDIC      left ankle    HX TRANSPLANT      kidney    HX UROLOGICAL      ureter implants       Social History     Socioeconomic History    Marital status: UNKNOWN     Spouse name: Not on file    Number of children: Not on file    Years of education: Not on file    Highest education level: Not on file   Occupational History    Not on file   Tobacco Use    Smoking status: Never    Smokeless tobacco: Never   Substance and Sexual Activity    Alcohol use: Yes     Comment: occ    Drug use: Not Currently    Sexual activity: Not Currently   Other Topics Concern    Not on file   Social History Narrative    Not on file     Social Determinants of Health     Financial Resource Strain: Low Risk     Difficulty of Paying Living Expenses: Not hard at all   Food Insecurity: No Food Insecurity    Worried About Running Out of Food in the Last Year: Never true    Ran Out of Food in the Last Year: Never true   Transportation Needs: Not on file   Physical Activity: Not on file   Stress: Not on file   Social Connections: Not on file   Intimate Partner Violence: Not on file   Housing Stability: Not on file       REVIEW OF SYSTEMS:    14 point review of systems on the intake form is negative except as noted in the HPI    PHYSICAL EXAMINATION:  Visit Vitals  Pulse (!) 108   Resp 16   Ht 5' 1\" (1.549 m)   Wt 285 lb 9.6 oz (129.5 kg)   SpO2 96%   BMI 53.96 kg/m²     Body mass index is 53.96 kg/m². GENERAL: Alert and oriented x3, in no acute distress, well-developed, well-nourished. HEENT: Normocephalic, atraumatic. Shoulder Examination     R   L  ROM   FF  Full   Full  ER  Full   Full   IR  Full   Full  Rotator Cuff Pain   Supra  +   -   Infra  +   -   Subscap -   -  Crepitus  +   -  Effusion  -   -  Warmth  -   -   Erythema  -   -  Instability  -   -  AC Joint TTP  -   -  Clavicle   Deformity -   -   TTP  -   -  Proximal Humerus   Deformity -   -   TTP  -   -  Deltoid Strength 5   5  Biceps Strength 5   5  Biceps Deformity -   -  Biceps Groove Pain -   -  Impingement Sign -   -       IMAGING:  Imaging read by myself and interpreted as follows:  X-rays 4 views of the right shoulder were taken in the office today. These show rotator cuff arthropathy with a high riding humeral head and arthritic change of the glenohumeral joint. ASSESSMENT & PLAN  Diagnosis: Right shoulder rotator cuff arthropathy, left leg length discrepancy    For Ashley's left leg length discrepancy and hip dysplasia I recommended she follow-up with Dr. Chryl Schilder to discuss possible surgical treatment options or even a shoe lift for her left leg. For her right shoulder which has rotator cuff arthropathy she is not having much pain at this time and her function is not terrible. Therefore I recommend continuing to watch this with conservative treatment. I did discuss the possibility of a reverse shoulder replacement the future which I think is likely however at this time due to her age, morbid obesity, and relatively preserved function and low pain level I would not recommend an surgery at this time. We will continue to watch this and I will see her back as needed. Prescription medication management discussed.      Electronically signed by: Em Valencia MD    Note: This note was completed using voice recognition software.   Any typographical/name errors or mistakes are unintentional.

## 2023-01-16 PROBLEM — I73.9 PAD (PERIPHERAL ARTERY DISEASE) (HCC): Status: ACTIVE | Noted: 2023-01-16

## 2023-01-20 ENCOUNTER — OFFICE VISIT (OUTPATIENT)
Dept: ORTHOPEDIC SURGERY | Age: 48
End: 2023-01-20
Payer: MEDICARE

## 2023-01-20 VITALS — BODY MASS INDEX: 54.75 KG/M2 | HEIGHT: 61 IN | WEIGHT: 290 LBS

## 2023-01-20 DIAGNOSIS — Z94.0 KIDNEY TRANSPLANT RECIPIENT: ICD-10-CM

## 2023-01-20 DIAGNOSIS — M21.70 LEG LENGTH DISCREPANCY: ICD-10-CM

## 2023-01-20 DIAGNOSIS — E66.01 CLASS 3 SEVERE OBESITY WITH SERIOUS COMORBIDITY AND BODY MASS INDEX (BMI) OF 50.0 TO 59.9 IN ADULT, UNSPECIFIED OBESITY TYPE (HCC): ICD-10-CM

## 2023-01-20 DIAGNOSIS — Q65.89 CONGENITAL DYSPLASIA OF LEFT HIP: Primary | ICD-10-CM

## 2023-01-20 DIAGNOSIS — M21.372 LEFT FOOT DROP: ICD-10-CM

## 2023-01-20 NOTE — PROGRESS NOTES
Patient: Rishabh Reed                MRN: [id number]       SSN: [id number]  YOB: 1975        AGE: 52 y.o. SEX: female  Body mass index is 54.8 kg/m². PCP: Marcos Abernathy NP  01/20/23    Chief Complaint: Short left leg      ICD-10-CM ICD-9-CM    1. Congenital dysplasia of left hip  Q65.89 755.63 AMB POC X-RAY RADEX HIP UNI WITH PELVIS 2-3 VIEWS      2. Class 3 severe obesity with serious comorbidity and body mass index (BMI) of 50.0 to 59.9 in adult, unspecified obesity type (HCC)  E66.01 278.01 REFERRAL TO BARIATRIC SURGERY    Z68.43 V85.43       3. Kidney transplant recipient  Z94.0 V42.0       4. Leg length discrepancy  M21.70 736.81 XR BONE LENGTH STDY      5. Left foot drop  M21.372 736.79 REFERRAL TO ORTHOPEDIC SURGERY          HPI: Rishabh Reed is a 52 y.o. female patient with short left leg. Patient has spina bifida affecting her kidneys and bladder and has had a kidney transplant. 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 She denies any pain left lower extremity. She does have some back pain. She has worn a lift in the past of approximately 2 inches. These have worn out. She moved to this area about 3 years ago and has not established care yet. Tobacco Use: Low Risk     Smoking Tobacco Use: Never    Smokeless Tobacco Use: Never    Passive Exposure: Not on file         PHYSICAL EXAMINATION:  Visit Vitals  Ht 5' 1\" (1.549 m)   Wt 290 lb (131.5 kg)   BMI 54.80 kg/m²     Body mass index is 54.8 kg/m². GENERAL: Alert and oriented x3, in no acute distress. HEENT: Normocephalic, atraumatic. MSK: Left Hip Exam     Tenderness   The patient is experiencing no tenderness. Comments:  Left lower extremity is shortened by approximately 2 inches. There is significant left lymphedema in the left lower extremity. Patient is wearing an AFO brace on the left lower extremity and the straps are indenting her skin quite a bit.   There is no skin breakdown. When taking off the AFO she definitely demonstrates about 3 out of 5 strength at best to dorsiflexion of the left ankle. IMAGING:  Imaging read by myself and interpreted as follows:  3 view x-rays left hip including AP pelvis and AP and lateral of the left hip demonstrate underdeveloped left femur about 75% of the size of the right femur. The femoral head is malformed short varus. There is mild dysplasia of the acetabulum. ASSESSMENT & PLAN  Diagnosis: 52 y.o. female with congenital underdevelopment of the left limb. She denies any pain in the hip. She has done well with a lift in the past and I think this is the next best step along with weight loss for her. I will refer her to the bariatric program for weight loss and I will get a CT scanogram of the bilateral lower extremities to get the exact leg length discrepancy. Was I have this I will order a custom shoe lift for the left foot. I can see her on an as-needed basis. I will also have her see Dr. Shahram Sheehan to reevaluate the left foot. Past Medical History:   Diagnosis Date    Chronic kidney disease     Chronic pain     Dialysis patient ([facility]     GERD (gastroesophageal reflux disease)     Hypercholesterolemia     Hypertension        Family History   Problem Relation Age of Onset    Diabetes Mother     Lung Disease Mother     COPD Mother     Diabetes Father     Hypertension Father        Current Outpatient Medications   Medication Sig Dispense Refill    cycloSPORINE (SandIMMUNE) 100 mg capsule Take 25 mg by mouth two (2) times a day. cloNIDine HCL (CATAPRES) 0.1 mg tablet TAKE 1 TABLET BY MOUTH EVERY DAY      HYDROcodone-acetaminophen (XODOL) 5-300 mg tablet Take  by mouth two (2) times daily as needed for Pain.       cetirizine (ZYRTEC) 10 mg tablet Take  by mouth.      gabapentin (NEURONTIN) 300 mg capsule TAKE 1 C PO QD      oxybutynin (DITROPAN) 5 mg tablet oxybutynin chloride 5 mg tablet   TK 1 T PO TID omeprazole (PRILOSEC) 20 mg capsule TK 1 C PO D      cholecalciferol (VITAMIN D3) (5000 Units /125 mcg) capsule Take 5,000 Units by mouth daily. atorvastatin (LIPITOR) 10 mg tablet 20 mg.      busPIRone (BUSPAR) 10 mg tablet 10 mg as needed. predniSONE (DELTASONE) 5 mg tablet 40 mg.      tiZANidine (ZANAFLEX) 2 mg tablet TK 1 T PO HS PRF MUSCLE SPASM      folic acid (FOLVITE) 1 mg tablet folic acid 1 mg tablet   TK 1 T PO D      DULoxetine (CYMBALTA) 60 mg capsule Take 120 mg by mouth nightly. mycophenolate mofetil (CELLCEPT) 250 mg capsule Take 750 mg by mouth daily. methenamine hippurate (HIPREX) 1 gram tablet Take 1 Tab by mouth daily.  30 Tab 1        Allergies   Allergen Reactions    Iron Nausea and Vomiting     Only iron in pill form        Past Surgical History:   Procedure Laterality Date    HX ORTHOPAEDIC      left ankle    HX TRANSPLANT      kidney    HX UROLOGICAL      ureter implants       Social History     Socioeconomic History    Marital status: UNKNOWN     Spouse name: Not on file    Number of children: Not on file    Years of education: Not on file    Highest education level: Not on file   Occupational History    Not on file   Tobacco Use    Smoking status: Never    Smokeless tobacco: Never   Substance and Sexual Activity    Alcohol use: Yes     Comment: occ    Drug use: Not Currently    Sexual activity: Not Currently   Other Topics Concern    Not on file   Social History Narrative    Not on file     Social Determinants of Health     Financial Resource Strain: Low Risk     Difficulty of Paying Living Expenses: Not hard at all   Food Insecurity: No Food Insecurity    Worried About Running Out of Food in the Last Year: Never true    Ran Out of Food in the Last Year: Never true   Transportation Needs: Not on file   Physical Activity: Not on file   Stress: Not on file   Social Connections: Not on file   Intimate Partner Violence: Not on file   Housing Stability: Not on file REVIEW OF SYSTEMS:      No changes from previous review of systems unless noted. Prescription medication management discussed with patient. Electronically signed by: Ryanne Sellers DO    Note: This note was completed using voice recognition software.   Any typographical/name errors or mistakes are unintentional.

## 2023-01-27 DIAGNOSIS — M21.70 UNEQUAL LEG LENGTH: Primary | ICD-10-CM

## 2023-02-14 DIAGNOSIS — M21.372 LEFT FOOT DROP: Primary | ICD-10-CM

## 2023-02-17 ENCOUNTER — TELEPHONE (OUTPATIENT)
Age: 48
End: 2023-02-17

## 2023-03-03 ENCOUNTER — TELEPHONE (OUTPATIENT)
Age: 48
End: 2023-03-03

## 2023-03-03 DIAGNOSIS — M21.70 LEG LENGTH DISCREPANCY: Primary | ICD-10-CM

## 2023-05-08 ENCOUNTER — OFFICE VISIT (OUTPATIENT)
Facility: CLINIC | Age: 48
End: 2023-05-08
Payer: MEDICARE

## 2023-05-08 VITALS
TEMPERATURE: 98.1 F | WEIGHT: 290 LBS | DIASTOLIC BLOOD PRESSURE: 87 MMHG | HEART RATE: 92 BPM | BODY MASS INDEX: 54.75 KG/M2 | HEIGHT: 61 IN | OXYGEN SATURATION: 92 % | SYSTOLIC BLOOD PRESSURE: 135 MMHG | RESPIRATION RATE: 18 BRPM

## 2023-05-08 DIAGNOSIS — I10 PRIMARY HYPERTENSION: Primary | ICD-10-CM

## 2023-05-08 DIAGNOSIS — Z12.11 COLON CANCER SCREENING: ICD-10-CM

## 2023-05-08 DIAGNOSIS — F33.0 MAJOR DEPRESSIVE DISORDER, RECURRENT, MILD (HCC): ICD-10-CM

## 2023-05-08 DIAGNOSIS — I73.9 PAD (PERIPHERAL ARTERY DISEASE) (HCC): ICD-10-CM

## 2023-05-08 DIAGNOSIS — E78.00 HYPERCHOLESTEROLEMIA: ICD-10-CM

## 2023-05-08 DIAGNOSIS — Z12.31 ENCOUNTER FOR SCREENING MAMMOGRAM FOR MALIGNANT NEOPLASM OF BREAST: ICD-10-CM

## 2023-05-08 DIAGNOSIS — R73.03 PREDIABETES: ICD-10-CM

## 2023-05-08 PROBLEM — E55.9 VITAMIN D DEFICIENCY: Status: ACTIVE | Noted: 2017-10-27

## 2023-05-08 PROCEDURE — G8417 CALC BMI ABV UP PARAM F/U: HCPCS | Performed by: NURSE PRACTITIONER

## 2023-05-08 PROCEDURE — 3074F SYST BP LT 130 MM HG: CPT | Performed by: NURSE PRACTITIONER

## 2023-05-08 PROCEDURE — 3078F DIAST BP <80 MM HG: CPT | Performed by: NURSE PRACTITIONER

## 2023-05-08 PROCEDURE — G8427 DOCREV CUR MEDS BY ELIG CLIN: HCPCS | Performed by: NURSE PRACTITIONER

## 2023-05-08 PROCEDURE — 1036F TOBACCO NON-USER: CPT | Performed by: NURSE PRACTITIONER

## 2023-05-08 PROCEDURE — 99214 OFFICE O/P EST MOD 30 MIN: CPT | Performed by: NURSE PRACTITIONER

## 2023-05-08 RX ORDER — LORATADINE 10 MG/1
10 TABLET ORAL DAILY
COMMUNITY

## 2023-05-08 SDOH — ECONOMIC STABILITY: HOUSING INSECURITY
IN THE LAST 12 MONTHS, WAS THERE A TIME WHEN YOU DID NOT HAVE A STEADY PLACE TO SLEEP OR SLEPT IN A SHELTER (INCLUDING NOW)?: NO

## 2023-05-08 SDOH — ECONOMIC STABILITY: FOOD INSECURITY: WITHIN THE PAST 12 MONTHS, YOU WORRIED THAT YOUR FOOD WOULD RUN OUT BEFORE YOU GOT MONEY TO BUY MORE.: NEVER TRUE

## 2023-05-08 SDOH — ECONOMIC STABILITY: FOOD INSECURITY: WITHIN THE PAST 12 MONTHS, THE FOOD YOU BOUGHT JUST DIDN'T LAST AND YOU DIDN'T HAVE MONEY TO GET MORE.: NEVER TRUE

## 2023-05-08 SDOH — ECONOMIC STABILITY: INCOME INSECURITY: HOW HARD IS IT FOR YOU TO PAY FOR THE VERY BASICS LIKE FOOD, HOUSING, MEDICAL CARE, AND HEATING?: NOT HARD AT ALL

## 2023-05-08 ASSESSMENT — PATIENT HEALTH QUESTIONNAIRE - PHQ9
SUM OF ALL RESPONSES TO PHQ QUESTIONS 1-9: 0
5. POOR APPETITE OR OVEREATING: 0
7. TROUBLE CONCENTRATING ON THINGS, SUCH AS READING THE NEWSPAPER OR WATCHING TELEVISION: 0
10. IF YOU CHECKED OFF ANY PROBLEMS, HOW DIFFICULT HAVE THESE PROBLEMS MADE IT FOR YOU TO DO YOUR WORK, TAKE CARE OF THINGS AT HOME, OR GET ALONG WITH OTHER PEOPLE: 0
SUM OF ALL RESPONSES TO PHQ QUESTIONS 1-9: 0
SUM OF ALL RESPONSES TO PHQ QUESTIONS 1-9: 0
3. TROUBLE FALLING OR STAYING ASLEEP: 0
4. FEELING TIRED OR HAVING LITTLE ENERGY: 0
2. FEELING DOWN, DEPRESSED OR HOPELESS: 0
6. FEELING BAD ABOUT YOURSELF - OR THAT YOU ARE A FAILURE OR HAVE LET YOURSELF OR YOUR FAMILY DOWN: 0
1. LITTLE INTEREST OR PLEASURE IN DOING THINGS: 0
SUM OF ALL RESPONSES TO PHQ9 QUESTIONS 1 & 2: 0
SUM OF ALL RESPONSES TO PHQ QUESTIONS 1-9: 0
9. THOUGHTS THAT YOU WOULD BE BETTER OFF DEAD, OR OF HURTING YOURSELF: 0
8. MOVING OR SPEAKING SO SLOWLY THAT OTHER PEOPLE COULD HAVE NOTICED. OR THE OPPOSITE, BEING SO FIGETY OR RESTLESS THAT YOU HAVE BEEN MOVING AROUND A LOT MORE THAN USUAL: 0

## 2023-05-08 NOTE — PROGRESS NOTES
[address] [place] [address]               [phone]      Lang Macias is a 50 y.o. female and presents with Follow-up Chronic Condition, Hypertension, and Cholesterol Problem       Assessment/Plan:    1. Primary hypertension  Endorses medication compliance and Blood pressure stable, continue clonidine at same dose  2. Hypercholesterolemia  Endorses medication compliance and Denies abdominal pain or symptoms of myalgia  3. Major depressive disorder, recurrent, mild ([address]  Currently well controlled on cymbalta, managed by [facility]  4. PAD (peripheral artery disease) (HCC)  Assessment & Plan:   Well-controlled, continue current treatment plan    5. Prediabetes  -     Hemoglobin A1C; Future  -     Comprehensive Metabolic Panel; Future  Follow up labs today  6. Encounter for screening mammogram for malignant neoplasm of breast  -     CEFERINO DIGITAL SCREEN W OR WO CAD BILATERAL; Future    7. Colon cancer screening  -     Cologuard (Fecal DNA Colorectal Cancer Screening)       Labs are done by nephrology  Follow up and disposition:   Return in about 6 months (around 11/8/2023) for [address] [place] [address], HTN, HLD, 30min, office. Subjective:    Labs obtained prior to visit? No  Reviewed with patient? N/A    Hypertension:  /87 Comment: feet flat on floor  Pulse 92   Temp 98.1 °F (36.7 °C) (Temporal)   Resp 18   Ht 5' 1\" (1.549 m)   Wt 290 lb (131.5 kg)   SpO2 92%   BMI 54.80 kg/m²    Patient reports taking medications as instructed. Yes   Medication side effects noted no  Headache upon wakening. no   Home BP monitoring: no  Do you experience chest pain/pressure or SOB with exertion? no  Maintain a low Sodium diet? Yes  No results found for: BUN, CREATININE, LABGLOM, K  Key Anti-Hypertensive Meds            cloNIDine (CATAPRES) 0.1 MG tablet (Taking)    Class: Historical Med         HLD:  Has been compliant with meds  Yes  Compliant with low-fat diet.   most of the time    Denies myalgias

## 2023-05-08 NOTE — PROGRESS NOTES
Room 7    When asked if patient has any concerns she would like to address with ELIAS Tineo patient states yes I feel like I was having menstrual cramps but only on the left side . Patient states I the cramps last for a bout 2 minutes . Did patient bring someone? No     Did the patient have DME equipment? Yes scooter     Did you take your medication today? Patient states I take medication at night       1. \"Have you been to the ER, urgent care clinic since your last visit? Hospitalized since your last visit? \" Patient states I went to [facility] [address] [facility]. \"Have you seen or consulted any other health care providers outside of the [address] [place] [address] since your last visit? \" No     3. For patients aged 39-70: Has the patient had a colonoscopy / FIT/ Cologuard? Patient states I would do the Cologurad. If the patient is female:    4. For patients aged 41-77: Has the patient had a mammogram within the past 2 years? Order has been placed       5. For patients aged 21-65: Has the patient had a pap smear? {Cancer Care Gap present? Patient states I will schedule an appointment  with TANG Duran       PHQ-9  5/8/2023   Little interest or pleasure in doing things 0   Little interest or pleasure in doing things -   Feeling down, depressed, or hopeless 0   Trouble falling or staying asleep, or sleeping too much 0   Feeling tired or having little energy 0   Poor appetite or overeating 0   Feeling bad about yourself - or that you are a failure or have let yourself or your family down 0   Trouble concentrating on things, such as reading the newspaper or watching television 0   Moving or speaking so slowly that other people could have noticed.  Or the opposite - being so fidgety or restless that you have been moving around a lot more than usual 0   Thoughts that you would be better off dead, or of hurting yourself in some way 0   PHQ-2 Score 0   Total Score PHQ 2 -   PHQ-9 Total Score 0   If you

## 2023-05-09 LAB
HBA1C MFR BLD: 7.3 % (ref 4.8–5.6)
SPECIMEN STATUS REPORT: NORMAL

## 2023-05-23 LAB — NONINV COLON CA DNA+OCC BLD SCRN STL QL: NEGATIVE

## 2023-05-24 DIAGNOSIS — E11.9 TYPE 2 DIABETES MELLITUS WITHOUT COMPLICATION, WITHOUT LONG-TERM CURRENT USE OF INSULIN (HCC): Primary | ICD-10-CM

## 2023-05-24 RX ORDER — METFORMIN HYDROCHLORIDE 500 MG/1
500 TABLET, EXTENDED RELEASE ORAL
Qty: 90 TABLET | Refills: 1 | Status: SHIPPED | OUTPATIENT
Start: 2023-05-24

## 2023-05-24 NOTE — PROGRESS NOTES
Patient called to review lab results. A1C was 7.4%, advised to start metformin  Patient in agreement, Rx for metformin ER 500mg once a day sent to pharmacy  Asked her to follow up with me in three months with labs prior  She will call the office to make an appointment.

## 2023-07-06 ENCOUNTER — TELEPHONE (OUTPATIENT)
Facility: CLINIC | Age: 48
End: 2023-07-06

## 2023-07-06 DIAGNOSIS — N31.9 NEUROGENIC BLADDER: Primary | ICD-10-CM

## 2023-07-14 PROBLEM — N31.9 NEUROGENIC BLADDER: Status: ACTIVE | Noted: 2023-07-14

## 2023-11-08 ENCOUNTER — OFFICE VISIT (OUTPATIENT)
Facility: CLINIC | Age: 48
End: 2023-11-08
Payer: MEDICARE

## 2023-11-08 VITALS
TEMPERATURE: 97.2 F | WEIGHT: 291 LBS | RESPIRATION RATE: 18 BRPM | BODY MASS INDEX: 54.94 KG/M2 | DIASTOLIC BLOOD PRESSURE: 76 MMHG | HEART RATE: 96 BPM | HEIGHT: 61 IN | OXYGEN SATURATION: 95 % | SYSTOLIC BLOOD PRESSURE: 129 MMHG

## 2023-11-08 DIAGNOSIS — E78.00 HYPERCHOLESTEROLEMIA: ICD-10-CM

## 2023-11-08 DIAGNOSIS — E11.9 TYPE 2 DIABETES MELLITUS WITHOUT COMPLICATION, WITHOUT LONG-TERM CURRENT USE OF INSULIN (HCC): ICD-10-CM

## 2023-11-08 DIAGNOSIS — Z00.00 MEDICARE ANNUAL WELLNESS VISIT, SUBSEQUENT: Primary | ICD-10-CM

## 2023-11-08 DIAGNOSIS — Z71.89 ADVANCED CARE PLANNING/COUNSELING DISCUSSION: ICD-10-CM

## 2023-11-08 DIAGNOSIS — I10 PRIMARY HYPERTENSION: ICD-10-CM

## 2023-11-08 PROBLEM — M21.70 LEG LENGTH DISCREPANCY: Status: ACTIVE | Noted: 2023-11-08

## 2023-11-08 PROCEDURE — 3078F DIAST BP <80 MM HG: CPT | Performed by: NURSE PRACTITIONER

## 2023-11-08 PROCEDURE — G8484 FLU IMMUNIZE NO ADMIN: HCPCS | Performed by: NURSE PRACTITIONER

## 2023-11-08 PROCEDURE — 3074F SYST BP LT 130 MM HG: CPT | Performed by: NURSE PRACTITIONER

## 2023-11-08 PROCEDURE — 3051F HG A1C>EQUAL 7.0%<8.0%: CPT | Performed by: NURSE PRACTITIONER

## 2023-11-08 PROCEDURE — G0439 PPPS, SUBSEQ VISIT: HCPCS | Performed by: NURSE PRACTITIONER

## 2023-11-08 RX ORDER — CHOLECALCIFEROL (VITAMIN D3) 25 MCG
CAPSULE ORAL
COMMUNITY
Start: 2023-08-07

## 2023-11-08 RX ORDER — ATORVASTATIN CALCIUM 20 MG/1
20 TABLET, FILM COATED ORAL
COMMUNITY
Start: 2023-10-12

## 2023-11-08 RX ORDER — CYCLOSPORINE 25 MG/1
25 CAPSULE, GELATIN COATED ORAL 2 TIMES DAILY
COMMUNITY
Start: 2023-10-24

## 2023-11-08 SDOH — ECONOMIC STABILITY: INCOME INSECURITY: HOW HARD IS IT FOR YOU TO PAY FOR THE VERY BASICS LIKE FOOD, HOUSING, MEDICAL CARE, AND HEATING?: NOT HARD AT ALL

## 2023-11-08 SDOH — ECONOMIC STABILITY: FOOD INSECURITY: WITHIN THE PAST 12 MONTHS, YOU WORRIED THAT YOUR FOOD WOULD RUN OUT BEFORE YOU GOT MONEY TO BUY MORE.: NEVER TRUE

## 2023-11-08 SDOH — ECONOMIC STABILITY: FOOD INSECURITY: WITHIN THE PAST 12 MONTHS, THE FOOD YOU BOUGHT JUST DIDN'T LAST AND YOU DIDN'T HAVE MONEY TO GET MORE.: NEVER TRUE

## 2023-11-08 ASSESSMENT — ANXIETY QUESTIONNAIRES
2. NOT BEING ABLE TO STOP OR CONTROL WORRYING: 0
3. WORRYING TOO MUCH ABOUT DIFFERENT THINGS: 0
IF YOU CHECKED OFF ANY PROBLEMS ON THIS QUESTIONNAIRE, HOW DIFFICULT HAVE THESE PROBLEMS MADE IT FOR YOU TO DO YOUR WORK, TAKE CARE OF THINGS AT HOME, OR GET ALONG WITH OTHER PEOPLE: NOT DIFFICULT AT ALL
5. BEING SO RESTLESS THAT IT IS HARD TO SIT STILL: 0
1. FEELING NERVOUS, ANXIOUS, OR ON EDGE: 0
6. BECOMING EASILY ANNOYED OR IRRITABLE: 0
4. TROUBLE RELAXING: 0
GAD7 TOTAL SCORE: 0
7. FEELING AFRAID AS IF SOMETHING AWFUL MIGHT HAPPEN: 0

## 2023-11-08 ASSESSMENT — PATIENT HEALTH QUESTIONNAIRE - PHQ9
2. FEELING DOWN, DEPRESSED OR HOPELESS: 0
1. LITTLE INTEREST OR PLEASURE IN DOING THINGS: 0
SUM OF ALL RESPONSES TO PHQ9 QUESTIONS 1 & 2: 0
7. TROUBLE CONCENTRATING ON THINGS, SUCH AS READING THE NEWSPAPER OR WATCHING TELEVISION: 0
6. FEELING BAD ABOUT YOURSELF - OR THAT YOU ARE A FAILURE OR HAVE LET YOURSELF OR YOUR FAMILY DOWN: 0
8. MOVING OR SPEAKING SO SLOWLY THAT OTHER PEOPLE COULD HAVE NOTICED. OR THE OPPOSITE, BEING SO FIGETY OR RESTLESS THAT YOU HAVE BEEN MOVING AROUND A LOT MORE THAN USUAL: 0
5. POOR APPETITE OR OVEREATING: 0
SUM OF ALL RESPONSES TO PHQ QUESTIONS 1-9: 0
9. THOUGHTS THAT YOU WOULD BE BETTER OFF DEAD, OR OF HURTING YOURSELF: 0
SUM OF ALL RESPONSES TO PHQ QUESTIONS 1-9: 0
4. FEELING TIRED OR HAVING LITTLE ENERGY: 0
SUM OF ALL RESPONSES TO PHQ QUESTIONS 1-9: 0
SUM OF ALL RESPONSES TO PHQ QUESTIONS 1-9: 0
3. TROUBLE FALLING OR STAYING ASLEEP: 0
10. IF YOU CHECKED OFF ANY PROBLEMS, HOW DIFFICULT HAVE THESE PROBLEMS MADE IT FOR YOU TO DO YOUR WORK, TAKE CARE OF THINGS AT HOME, OR GET ALONG WITH OTHER PEOPLE: 0

## 2023-11-08 ASSESSMENT — COLUMBIA-SUICIDE SEVERITY RATING SCALE - C-SSRS
4. HAVE YOU HAD THESE THOUGHTS AND HAD SOME INTENTION OF ACTING ON THEM?: NO
5. HAVE YOU STARTED TO WORK OUT OR WORKED OUT THE DETAILS OF HOW TO KILL YOURSELF? DO YOU INTEND TO CARRY OUT THIS PLAN?: NO
3. HAVE YOU BEEN THINKING ABOUT HOW YOU MIGHT KILL YOURSELF?: NO
7. DID THIS OCCUR IN THE LAST THREE MONTHS: NO

## 2023-11-08 ASSESSMENT — LIFESTYLE VARIABLES
HOW MANY STANDARD DRINKS CONTAINING ALCOHOL DO YOU HAVE ON A TYPICAL DAY: 1 OR 2
HOW OFTEN DO YOU HAVE A DRINK CONTAINING ALCOHOL: MONTHLY OR LESS

## 2023-11-08 NOTE — ACP (ADVANCE CARE PLANNING)
Advance Care Planning     General Advance Care Planning (ACP) Conversation    Date of Conversation: 11/8/2023  Conducted with: Patient with Decision Making Capacity    Healthcare Decision Maker:    Primary Decision Maker: Jayson Mendoza - [facility] [phone]  Click here to complete Healthcare Decision Makers including selection of the Healthcare Decision Maker Relationship (ie \"Primary\"). Today we documented Decision Maker(s) consistent with Legal Next of Kin hierarchy.     Content/Action Overview:  Has NO ACP documents/care preferences - information provided, considering goals and options  Reviewed DNR/DNI and patient elects Full Code (Attempt Resuscitation)  resuscitation preferences      Length of Voluntary ACP Conversation in minutes:  <16 minutes (Non-Billable)    RACHEL King - CNP

## 2023-11-09 LAB
ALBUMIN SERPL-MCNC: 4.1 G/DL (ref 3.9–4.9)
ALBUMIN/GLOB SERPL: 1.5 {RATIO} (ref 1.2–2.2)
ALP SERPL-CCNC: 139 IU/L (ref 44–121)
ALT SERPL-CCNC: 12 IU/L (ref 0–32)
AST SERPL-CCNC: 11 IU/L (ref 0–40)
BASOPHILS # BLD AUTO: 0 X10E3/UL (ref 0–0.2)
BASOPHILS NFR BLD AUTO: 0 %
BILIRUB SERPL-MCNC: 0.2 MG/DL (ref 0–1.2)
BUN SERPL-MCNC: 17 MG/DL (ref 6–24)
BUN/CREAT SERPL: 14 (ref 9–23)
CALCIUM SERPL-MCNC: 9.7 MG/DL (ref 8.7–10.2)
CHLORIDE SERPL-SCNC: 107 MMOL/L (ref 96–106)
CHOLEST SERPL-MCNC: 160 MG/DL (ref 100–199)
CO2 SERPL-SCNC: 17 MMOL/L (ref 20–29)
CREAT SERPL-MCNC: 1.22 MG/DL (ref 0.57–1)
EGFRCR SERPLBLD CKD-EPI 2021: 55 ML/MIN/1.73
EOSINOPHIL # BLD AUTO: 0.2 X10E3/UL (ref 0–0.4)
EOSINOPHIL NFR BLD AUTO: 2 %
ERYTHROCYTE [DISTWIDTH] IN BLOOD BY AUTOMATED COUNT: 14.7 % (ref 11.7–15.4)
GLOBULIN SER CALC-MCNC: 2.8 G/DL (ref 1.5–4.5)
GLUCOSE SERPL-MCNC: 145 MG/DL (ref 70–99)
HBA1C MFR BLD: 7.5 % (ref 4.8–5.6)
HCT VFR BLD AUTO: 37.2 % (ref 34–46.6)
HDLC SERPL-MCNC: 42 MG/DL
HGB BLD-MCNC: 11.8 G/DL (ref 11.1–15.9)
IMM GRANULOCYTES # BLD AUTO: 0 X10E3/UL (ref 0–0.1)
IMM GRANULOCYTES NFR BLD AUTO: 0 %
LDLC SERPL CALC-MCNC: 83 MG/DL (ref 0–99)
LYMPHOCYTES # BLD AUTO: 1.9 X10E3/UL (ref 0.7–3.1)
LYMPHOCYTES NFR BLD AUTO: 20 %
MCH RBC QN AUTO: 25.2 PG (ref 26.6–33)
MCHC RBC AUTO-ENTMCNC: 31.7 G/DL (ref 31.5–35.7)
MCV RBC AUTO: 79 FL (ref 79–97)
MONOCYTES # BLD AUTO: 0.4 X10E3/UL (ref 0.1–0.9)
MONOCYTES NFR BLD AUTO: 4 %
NEUTROPHILS # BLD AUTO: 7.1 X10E3/UL (ref 1.4–7)
NEUTROPHILS NFR BLD AUTO: 74 %
PLATELET # BLD AUTO: 293 X10E3/UL (ref 150–450)
POTASSIUM SERPL-SCNC: 4.1 MMOL/L (ref 3.5–5.2)
PROT SERPL-MCNC: 6.9 G/DL (ref 6–8.5)
RBC # BLD AUTO: 4.69 X10E6/UL (ref 3.77–5.28)
SODIUM SERPL-SCNC: 144 MMOL/L (ref 134–144)
SPECIMEN STATUS REPORT: NORMAL
TRIGL SERPL-MCNC: 206 MG/DL (ref 0–149)
VLDLC SERPL CALC-MCNC: 35 MG/DL (ref 5–40)
WBC # BLD AUTO: 9.7 X10E3/UL (ref 3.4–10.8)

## 2023-11-20 ENCOUNTER — TELEPHONE (OUTPATIENT)
Facility: CLINIC | Age: 48
End: 2023-11-20

## 2023-11-20 NOTE — TELEPHONE ENCOUNTER
Called to review labs.  Call unable to be completed at this time, unable to lmvm, will call back.

## 2024-01-19 ENCOUNTER — TELEPHONE (OUTPATIENT)
Facility: CLINIC | Age: 49
End: 2024-01-19

## 2024-01-25 NOTE — TELEPHONE ENCOUNTER
2nd attempt to call patient.  She just got out of the hospital and does not want to start any new medication for her DM at this time.    Care Transitions Initial Follow Up Call    Call within 2 business days of discharge: Yes     Patient: Nicole Grimes Patient : 1975 MRN: [id number]    [unfilled]    RARS: No data recorded     Spoke with: Nicole Hortongelathais    Discharge department/facility: [facility]    Non-face-to-face services provided:  Scheduled appointment with PCP-24 at 0945  VV    Follow Up  Future Appointments   Date Time Provider Department Center   2024  2:30 PM Nicole Brooke APRN - CNP [facility] RACHEL James CNP

## 2024-01-30 ENCOUNTER — TELEMEDICINE (OUTPATIENT)
Facility: CLINIC | Age: 49
End: 2024-01-30

## 2024-01-30 DIAGNOSIS — Z09 HOSPITAL DISCHARGE FOLLOW-UP: Primary | ICD-10-CM

## 2024-01-30 DIAGNOSIS — A41.9 SEPSIS WITHOUT ACUTE ORGAN DYSFUNCTION, DUE TO UNSPECIFIED ORGANISM (HCC): ICD-10-CM

## 2024-01-30 RX ORDER — LINEZOLID 600 MG/1
600 TABLET, FILM COATED ORAL EVERY 12 HOURS
COMMUNITY
Start: 2024-01-25 | End: 2024-02-08

## 2024-01-30 RX ORDER — LEVOFLOXACIN 500 MG/1
500 TABLET, FILM COATED ORAL EVERY 24 HOURS
COMMUNITY
Start: 2024-01-26 | End: 2024-02-08

## 2024-01-30 ASSESSMENT — PATIENT HEALTH QUESTIONNAIRE - PHQ9
4. FEELING TIRED OR HAVING LITTLE ENERGY: 2
SUM OF ALL RESPONSES TO PHQ QUESTIONS 1-9: 8
SUM OF ALL RESPONSES TO PHQ QUESTIONS 1-9: 8
3. TROUBLE FALLING OR STAYING ASLEEP: 2
6. FEELING BAD ABOUT YOURSELF - OR THAT YOU ARE A FAILURE OR HAVE LET YOURSELF OR YOUR FAMILY DOWN: 0
SUM OF ALL RESPONSES TO PHQ QUESTIONS 1-9: 8
5. POOR APPETITE OR OVEREATING: 1
8. MOVING OR SPEAKING SO SLOWLY THAT OTHER PEOPLE COULD HAVE NOTICED. OR THE OPPOSITE, BEING SO FIGETY OR RESTLESS THAT YOU HAVE BEEN MOVING AROUND A LOT MORE THAN USUAL: 0
SUM OF ALL RESPONSES TO PHQ QUESTIONS 1-9: 8
7. TROUBLE CONCENTRATING ON THINGS, SUCH AS READING THE NEWSPAPER OR WATCHING TELEVISION: 0
1. LITTLE INTEREST OR PLEASURE IN DOING THINGS: 3
9. THOUGHTS THAT YOU WOULD BE BETTER OFF DEAD, OR OF HURTING YOURSELF: 0
2. FEELING DOWN, DEPRESSED OR HOPELESS: 0
SUM OF ALL RESPONSES TO PHQ9 QUESTIONS 1 & 2: 3
10. IF YOU CHECKED OFF ANY PROBLEMS, HOW DIFFICULT HAVE THESE PROBLEMS MADE IT FOR YOU TO DO YOUR WORK, TAKE CARE OF THINGS AT HOME, OR GET ALONG WITH OTHER PEOPLE: 0

## 2024-01-30 NOTE — PROGRESS NOTES
Nicole Grimes is a 48 y.o. female (: 1975) presenting to address:    Chief Complaint   Patient presents with    Follow-Up from Hospital       There were no vitals filed for this visit.    Coordination of Care Questionaire:   1. \"Have you been to the ER, urgent care clinic since your last visit?  Hospitalized since your last visit?\" yes    2. \"Have you seen or consulted any other health care providers outside of the [facility] since your last visit?\" yes     3. For patients aged 45-75: Has the patient had a colonoscopy / FIT/ Cologuard? yes      If the patient is female:    4. For patients aged 40-74: Has the patient had a mammogram within the past 2 years? no      5. For patients aged 21-65: Has the patient had a pap smear? No    Advanced Directive:   1. Do you have an Advanced Directive? no    2. Would you like information on Advanced Directives? No

## 2024-01-30 NOTE — PROGRESS NOTES
Nicole Grimes, was evaluated through a synchronous (real-time) audio-video encounter. The patient (or guardian if applicable) is aware that this is a billable service, which includes applicable co-pays. This Virtual Visit was conducted with patient's (and/or legal guardian's) consent. Patient identification was verified, and a caregiver was present when appropriate.   The patient was located at Home: [address] [place] [id number]  Provider was located at Facility (Appt Dept): [address] [place] [address], [address]  [place] [address]      Nicole Grimes (:  1975) is a Established patient, presenting virtually for evaluation of the following:    Assessment & Plan   Below is the assessment and plan developed based on review of pertinent history, physical exam, labs, studies, and medications.  1. Hospital discharge follow-up  2. Sepsis without acute organ dysfunction, due to unspecified organism (HCC)  VV today for hospital follow up  She has not home needs post hospitalization  Advised to call her psychiatrist about the safety of taking linezolid with cymbalta  Advised to resume her clonidine  Patient verbalized understanding and is in agreement with this plan of care  Return in about 4 weeks (around 2024) for MAWV, DM, HTN, HLD, 30min, office (cancel 24 appt).       Subjective   HPI    Hospital follow up  Admitted 24  Discharged: 24  Copy of hospital notes  Sepsis due to Left mastoid sinus effusion, otitis media  Possible urinary tract infection  Leukopenia  -COVID influenza negative  -Blood cultures negative   - hx of ESBL, urine growing Staph aureus  -Continue Empiric vancomycin and cefepime D6, will complete 7 days IV abcx  -Failed o/p abcx  -Per ED discussion with ENT at [facility] no need to transfer; trial of IV antibiotics. Will need o/p ENT eval at [facility]   -CT head-left mastoid effusion  -leukopenia improved     Lactic acidosis, resolved  -S/p IV fluids. lactic acid

## 2024-02-08 ENCOUNTER — OFFICE VISIT (OUTPATIENT)
Age: 49
End: 2024-02-08
Payer: MEDICARE

## 2024-02-08 VITALS — HEIGHT: 61 IN | BODY MASS INDEX: 53.24 KG/M2 | WEIGHT: 282 LBS

## 2024-02-08 DIAGNOSIS — M21.70 ACQUIRED LEG LENGTH DISCREPANCY: ICD-10-CM

## 2024-02-08 DIAGNOSIS — R29.898 WEAKNESS OF LEFT LEG: ICD-10-CM

## 2024-02-08 DIAGNOSIS — M25.552 LEFT HIP PAIN: Primary | ICD-10-CM

## 2024-02-08 DIAGNOSIS — M70.62 TROCHANTERIC BURSITIS OF LEFT HIP: ICD-10-CM

## 2024-02-08 DIAGNOSIS — M25.652 DECREASED RANGE OF LEFT HIP MOVEMENT: ICD-10-CM

## 2024-02-08 DIAGNOSIS — M76.32 IT BAND SYNDROME, LEFT: ICD-10-CM

## 2024-02-08 DIAGNOSIS — M16.12 ARTHRITIS OF LEFT HIP: ICD-10-CM

## 2024-02-08 DIAGNOSIS — F40.240 CLAUSTROPHOBIA: ICD-10-CM

## 2024-02-08 PROCEDURE — G8484 FLU IMMUNIZE NO ADMIN: HCPCS | Performed by: PHYSICIAN ASSISTANT

## 2024-02-08 PROCEDURE — 99204 OFFICE O/P NEW MOD 45 MIN: CPT | Performed by: PHYSICIAN ASSISTANT

## 2024-02-08 PROCEDURE — G8417 CALC BMI ABV UP PARAM F/U: HCPCS | Performed by: PHYSICIAN ASSISTANT

## 2024-02-08 PROCEDURE — 73502 X-RAY EXAM HIP UNI 2-3 VIEWS: CPT | Performed by: PHYSICIAN ASSISTANT

## 2024-02-08 PROCEDURE — 1036F TOBACCO NON-USER: CPT | Performed by: PHYSICIAN ASSISTANT

## 2024-02-08 PROCEDURE — G8428 CUR MEDS NOT DOCUMENT: HCPCS | Performed by: PHYSICIAN ASSISTANT

## 2024-02-08 RX ORDER — DIAZEPAM 10 MG/1
TABLET ORAL
Qty: 2 TABLET | Refills: 0 | Status: SHIPPED | OUTPATIENT
Start: 2024-02-08 | End: 2024-02-09

## 2024-02-08 NOTE — PROGRESS NOTES
LE.  Neuro intact Mohit UE/LE to noxious stimuli      Ortho Specific exam:  Left lower extremity estimated 3 inch leg length discrepancy versus right.    Passive internal rotation of the left hip barely 3 degrees with external rotation 8 degrees estimated.  Pain in both planes of motion.    Significant muscle atrophy quad hamstring abductor and abductor left lower extremity.    Quad and femoral nerve activity full left lower extremity.    Hip flexor strength 3+/5 left lower extremity.    No calf tenderness or evidence of DVT left lower extremity.    AFO left lower extremity noted well-fitting well-placed.          IMAGING:  Imaging read by myself and interpreted as follows:  X-ray: [facility] 2/8/2024 space AP pelvis left hip reveals near complete collapse of the femoral neck and head with significant shortening of the hip when compared to right.  There is atrophy of the femoral bony complex.  No identified fracture deformities.    IMPRESSION:      ICD-10-CM    1. Left hip pain  M25.552 [75158] Hip Uni with Pelvis 2-3 Views      2. Arthritis of left hip  M16.12       3. Body mass index (BMI) 50.0-59.9, adult ([facility])  Z68.43       4. Decreased range of left hip movement  M25.652       5. Weakness of left leg  R29.898       6. It band syndrome, left  M76.32       7. Trochanteric bursitis of left hip  M70.62            PLAN:  Today we discussed alternatives care to include but not limited to a formal MRI of the left hip and left femur with possible left total joint replacement to be considered.  Patient to follow back once MRI has been completed for review.          Special note: Medication management discussed and patient will begin the following per recommended dosing.    (  ) Flector patch 1.3% topically twice daily to area of concern    (  ) Flexeril 10mg po 3 x daily as needed muscle spasm / pain    (  ) Physical therapy ordered for range of motion all modalities, stretching, range of motion of specific

## 2024-02-27 ENCOUNTER — OFFICE VISIT (OUTPATIENT)
Facility: CLINIC | Age: 49
End: 2024-02-27
Payer: MEDICARE

## 2024-02-27 VITALS
OXYGEN SATURATION: 98 % | DIASTOLIC BLOOD PRESSURE: 84 MMHG | HEART RATE: 101 BPM | WEIGHT: 284 LBS | RESPIRATION RATE: 16 BRPM | HEIGHT: 61 IN | BODY MASS INDEX: 53.62 KG/M2 | SYSTOLIC BLOOD PRESSURE: 138 MMHG | TEMPERATURE: 98.4 F

## 2024-02-27 DIAGNOSIS — I73.9 PAD (PERIPHERAL ARTERY DISEASE) (HCC): ICD-10-CM

## 2024-02-27 DIAGNOSIS — Z00.00 MEDICARE ANNUAL WELLNESS VISIT, SUBSEQUENT: Primary | ICD-10-CM

## 2024-02-27 DIAGNOSIS — E11.9 TYPE 2 DIABETES MELLITUS WITHOUT COMPLICATION, WITHOUT LONG-TERM CURRENT USE OF INSULIN (HCC): ICD-10-CM

## 2024-02-27 DIAGNOSIS — E78.00 HYPERCHOLESTEROLEMIA: ICD-10-CM

## 2024-02-27 DIAGNOSIS — Z71.89 ADVANCED CARE PLANNING/COUNSELING DISCUSSION: ICD-10-CM

## 2024-02-27 DIAGNOSIS — F33.0 MAJOR DEPRESSIVE DISORDER, RECURRENT, MILD (HCC): ICD-10-CM

## 2024-02-27 PROCEDURE — 3046F HEMOGLOBIN A1C LEVEL >9.0%: CPT | Performed by: NURSE PRACTITIONER

## 2024-02-27 PROCEDURE — G8484 FLU IMMUNIZE NO ADMIN: HCPCS | Performed by: NURSE PRACTITIONER

## 2024-02-27 PROCEDURE — G0439 PPPS, SUBSEQ VISIT: HCPCS | Performed by: NURSE PRACTITIONER

## 2024-02-27 PROCEDURE — 3075F SYST BP GE 130 - 139MM HG: CPT | Performed by: NURSE PRACTITIONER

## 2024-02-27 PROCEDURE — 3079F DIAST BP 80-89 MM HG: CPT | Performed by: NURSE PRACTITIONER

## 2024-02-27 RX ORDER — ATORVASTATIN CALCIUM 20 MG/1
20 TABLET, FILM COATED ORAL DAILY
Qty: 90 TABLET | Refills: 1 | Status: SHIPPED | OUTPATIENT
Start: 2024-02-27

## 2024-02-27 ASSESSMENT — PATIENT HEALTH QUESTIONNAIRE - PHQ9
1. LITTLE INTEREST OR PLEASURE IN DOING THINGS: 0
9. THOUGHTS THAT YOU WOULD BE BETTER OFF DEAD, OR OF HURTING YOURSELF: 0
SUM OF ALL RESPONSES TO PHQ9 QUESTIONS 1 & 2: 0
1. LITTLE INTEREST OR PLEASURE IN DOING THINGS: 0
8. MOVING OR SPEAKING SO SLOWLY THAT OTHER PEOPLE COULD HAVE NOTICED. OR THE OPPOSITE, BEING SO FIGETY OR RESTLESS THAT YOU HAVE BEEN MOVING AROUND A LOT MORE THAN USUAL: 0
2. FEELING DOWN, DEPRESSED OR HOPELESS: 0
SUM OF ALL RESPONSES TO PHQ QUESTIONS 1-9: 0
5. POOR APPETITE OR OVEREATING: 0
SUM OF ALL RESPONSES TO PHQ QUESTIONS 1-9: 0
SUM OF ALL RESPONSES TO PHQ QUESTIONS 1-9: 5
SUM OF ALL RESPONSES TO PHQ QUESTIONS 1-9: 5
4. FEELING TIRED OR HAVING LITTLE ENERGY: 3
7. TROUBLE CONCENTRATING ON THINGS, SUCH AS READING THE NEWSPAPER OR WATCHING TELEVISION: 0
SUM OF ALL RESPONSES TO PHQ9 QUESTIONS 1 & 2: 0
SUM OF ALL RESPONSES TO PHQ QUESTIONS 1-9: 5
3. TROUBLE FALLING OR STAYING ASLEEP: 2
SUM OF ALL RESPONSES TO PHQ QUESTIONS 1-9: 5
10. IF YOU CHECKED OFF ANY PROBLEMS, HOW DIFFICULT HAVE THESE PROBLEMS MADE IT FOR YOU TO DO YOUR WORK, TAKE CARE OF THINGS AT HOME, OR GET ALONG WITH OTHER PEOPLE: 0
SUM OF ALL RESPONSES TO PHQ QUESTIONS 1-9: 0
6. FEELING BAD ABOUT YOURSELF - OR THAT YOU ARE A FAILURE OR HAVE LET YOURSELF OR YOUR FAMILY DOWN: 0
SUM OF ALL RESPONSES TO PHQ QUESTIONS 1-9: 0
2. FEELING DOWN, DEPRESSED OR HOPELESS: 0

## 2024-02-27 ASSESSMENT — LIFESTYLE VARIABLES
HOW MANY STANDARD DRINKS CONTAINING ALCOHOL DO YOU HAVE ON A TYPICAL DAY: 1 OR 2
HOW OFTEN DO YOU HAVE A DRINK CONTAINING ALCOHOL: 2-4 TIMES A MONTH

## 2024-02-27 NOTE — PROGRESS NOTES
\"Have you been to the ER, urgent care clinic since your last visit?  Hospitalized since your last visit?\"    SPA- Sinus infection and bladder infection was admitted to hospital    “Have you seen or consulted any other health care providers outside of [facility] since your last visit?”    Yes- saw ENT for sinuses    “Have you had a pap smear?”    NO-Patient informed she is due.

## 2024-02-27 NOTE — PROGRESS NOTES
Medicare Annual Wellness Visit    Nicole Grimes is here for Medicare AWV and Follow-up Chronic Condition (3 month- DM, HTN, HLD)    Assessment & Plan   Medicare annual wellness visit, subsequent  Completed today to include ETOH and depression screening  Advanced care planning/counseling discussion  Healthcare decision maker verified and ACP Discussion performed and documented in note  Major depressive disorder, recurrent, mild (HCC)  Assessment & Plan:   Monitored by specialist- no acute findings meriting change in the plan  PAD (peripheral artery disease) (HCC)  Assessment & Plan:   Well-controlled, continue current medications  Type 2 diabetes mellitus without complication, without long-term current use of insulin (HCC)  Assessment & Plan:   Borderline controlled, continue current medications  Hypercholesterolemia  -     atorvastatin (LIPITOR) 20 MG tablet; Take 1 tablet by mouth daily at bedtime., Disp-90 tablet, R-1Normal  Refills provided        Recommendations for Preventive Services Due: see orders and patient instructions/AVS.  Recommended screening schedule for the next 5-10 years is provided to the patient in written form: see Patient Instructions/AVS.           Return in about 4 months (around 6/27/2024) for PAP, DM, HTN, HLD, 30min, office.     Subjective       Patient's complete Health Risk Assessment and screening values have been reviewed and are found in Flowsheets. The following problems were reviewed today and where indicated follow up appointments were made and/or referrals ordered.    Positive Risk Factor Screenings with Interventions:    Fall Risk:  Do you feel unsteady or are you worried about falling? : no  2 or more falls in past year?: (!) yes  Fall with injury in past year?: no     Interventions:    Patient comments: has not had any new falls since November 2023  Reviewed medications, home hazards, visual acuity, and co-morbidities that can increase risk for falls

## 2024-02-27 NOTE — ACP (ADVANCE CARE PLANNING)
Advance Care Planning     General Advance Care Planning (ACP) Conversation    Date of Conversation: 2/27/2024  Conducted with: Patient with Decision Making Capacity    Healthcare Decision Maker:    Primary Decision Maker: Tab Lemus - [phone]  Click here to complete Healthcare Decision Makers including selection of the Healthcare Decision Maker Relationship (ie \"Primary\").  Today we documented Decision Maker(s) consistent with Legal Next of Kin hierarchy.    Content/Action Overview:  Has NO ACP documents-Information provided  Reviewed DNR/DNI and patient elects Full Code (Attempt Resuscitation)  resuscitation preferences      Length of Voluntary ACP Conversation in minutes:  <16 minutes (Non-Billable)    Nicole Brooke, APRN - CNP

## 2024-02-28 DIAGNOSIS — M25.552 LEFT HIP PAIN: ICD-10-CM

## 2024-02-28 DIAGNOSIS — M21.70 ACQUIRED LEG LENGTH DISCREPANCY: ICD-10-CM

## 2024-02-28 DIAGNOSIS — R29.898 WEAKNESS OF LEFT LEG: ICD-10-CM

## 2024-02-28 DIAGNOSIS — M16.12 ARTHRITIS OF LEFT HIP: ICD-10-CM

## 2024-02-28 DIAGNOSIS — M25.652 DECREASED RANGE OF LEFT HIP MOVEMENT: ICD-10-CM

## 2024-04-18 ENCOUNTER — OFFICE VISIT (OUTPATIENT)
Age: 49
End: 2024-04-18
Payer: MEDICARE

## 2024-04-18 VITALS — BODY MASS INDEX: 53.24 KG/M2 | HEIGHT: 61 IN | WEIGHT: 282 LBS

## 2024-04-18 DIAGNOSIS — M16.12 ARTHRITIS OF LEFT HIP: Primary | ICD-10-CM

## 2024-04-18 PROCEDURE — 1036F TOBACCO NON-USER: CPT | Performed by: PHYSICIAN ASSISTANT

## 2024-04-18 PROCEDURE — G8428 CUR MEDS NOT DOCUMENT: HCPCS | Performed by: PHYSICIAN ASSISTANT

## 2024-04-18 PROCEDURE — G8417 CALC BMI ABV UP PARAM F/U: HCPCS | Performed by: PHYSICIAN ASSISTANT

## 2024-04-18 PROCEDURE — 99214 OFFICE O/P EST MOD 30 MIN: CPT | Performed by: PHYSICIAN ASSISTANT

## 2024-04-18 NOTE — PROGRESS NOTES
Patient: Nicole Grimes                MRN: [id number]       SSN: [id number]  YOB: 1975        AGE: 49 y.o.        SEX: female      OFFICE NOTE DICTATED    PCP: Nicole Brooke APRN - CNP  04/18/24 4/18/2024: Patient returns the office for follow-up regarding MRI results of her left femur as well as the left hip.  She persists with pain to the left hip and notes it feels like when many occurrences the hip is trying to dislocate.    Please see chart for MRI review date 4/18/2024.    Chief Complaint   Patient presents with    Hip Pain     Left         HISTORY:    Nicole Grimes is a 49 y.o. female presents to the office with a myriad of complaints to include all left lower extremity.  Patient recently relocated from the [place] area and intends to stay local [facility].  She has been seen by orthopedics in [facility] but no advanced care has been undertaken for patient's symptoms.    She has a history of a closed spinal bifida which resulted in maturing of her left lower extremity to include femur tib-fib and left foot.    She has a history of foot drop and uses an AFO left lower extremity.    She is a limited home ambulator using primarily a power mobility device.    Patient has pain today reported in the upper left hip with a significant leg length shortening when compared to the right.    No trauma or falls reported.    Spina bifida has congenital in her family with her mother having open spina bifida as a child.        No results found for: \"HBA1C\", \"[id number]\"      4/18/2024     1:22 PM 4/5/2024     9:49 AM 3/25/2024     2:56 PM 2/27/2024     3:35 PM 2/12/2024     1:29 PM 2/8/2024     2:47 PM 12/14/2023    11:42 AM   Weight Metrics   Weight 282 lb 282 lb 282 lb 284 lb 282 lb 282 lb 291 lb   BMI (Calculated) 53.4 kg/m2 53.4 kg/m2 53.4 kg/m2 53.8 kg/m2 53.4 kg/m2 53.4 kg/m2 55.1 kg/m2          Problem List Items Addressed This Visit    None        PAST MEDICAL

## 2024-04-23 ENCOUNTER — OFFICE VISIT (OUTPATIENT)
Age: 49
End: 2024-04-23
Payer: MEDICARE

## 2024-04-23 VITALS — BODY MASS INDEX: 50.68 KG/M2 | WEIGHT: 286 LBS | HEIGHT: 63 IN

## 2024-04-23 DIAGNOSIS — R29.898 WEAKNESS OF LEFT LEG: ICD-10-CM

## 2024-04-23 DIAGNOSIS — I73.9 PAD (PERIPHERAL ARTERY DISEASE) (HCC): ICD-10-CM

## 2024-04-23 DIAGNOSIS — Z94.0 KIDNEY TRANSPLANT RECIPIENT: ICD-10-CM

## 2024-04-23 DIAGNOSIS — E66.01 MORBID (SEVERE) OBESITY DUE TO EXCESS CALORIES (HCC): ICD-10-CM

## 2024-04-23 DIAGNOSIS — M21.70 ACQUIRED LEG LENGTH DISCREPANCY: ICD-10-CM

## 2024-04-23 DIAGNOSIS — E11.9 TYPE 2 DIABETES MELLITUS WITHOUT COMPLICATION, WITHOUT LONG-TERM CURRENT USE OF INSULIN (HCC): ICD-10-CM

## 2024-04-23 DIAGNOSIS — I10 PRIMARY HYPERTENSION: ICD-10-CM

## 2024-04-23 DIAGNOSIS — Q65.89 OTHER SPECIFIED CONGENITAL DEFORMITIES OF HIP: Primary | ICD-10-CM

## 2024-04-23 DIAGNOSIS — M21.372 FOOT DROP, LEFT: ICD-10-CM

## 2024-04-23 PROBLEM — E11.22 TYPE 2 DIABETES MELLITUS WITH CHRONIC KIDNEY DISEASE (HCC): Status: ACTIVE | Noted: 2024-04-23

## 2024-04-23 PROCEDURE — 2022F DILAT RTA XM EVC RTNOPTHY: CPT

## 2024-04-23 PROCEDURE — 99214 OFFICE O/P EST MOD 30 MIN: CPT

## 2024-04-23 PROCEDURE — G8417 CALC BMI ABV UP PARAM F/U: HCPCS

## 2024-04-23 PROCEDURE — 3046F HEMOGLOBIN A1C LEVEL >9.0%: CPT

## 2024-04-23 PROCEDURE — G8428 CUR MEDS NOT DOCUMENT: HCPCS

## 2024-04-23 PROCEDURE — 1036F TOBACCO NON-USER: CPT

## 2024-04-23 RX ORDER — LIDOCAINE 50 MG/G
1 PATCH TOPICAL DAILY
Qty: 30 PATCH | Refills: 0 | Status: SHIPPED | OUTPATIENT
Start: 2024-04-23 | End: 2024-05-23

## 2024-04-23 NOTE — PROGRESS NOTES
DAILY FOR 5 TO 7 DAYS      atorvastatin (LIPITOR) 20 MG tablet Take 1 tablet by mouth daily at bedtime. 90 tablet 1    azaTHIOprine 100 MG TABS Take by mouth      VITAMIN D HIGH POTENCY 25 MCG (1000 UT) CAPS TAKE 1 CAPSULE BY MOUTH DAILY AS DIRECTED      cycloSPORINE (SANDIMMUNE) 25 MG capsule Take 1 capsule by mouth 2 times daily      cloNIDine (CATAPRES) 0.1 MG tablet TAKE 1 TABLET BY MOUTH EVERY DAY      DULoxetine (CYMBALTA) 60 MG extended release capsule Take 2 capsules by mouth      folic acid (FOLVITE) 1 MG tablet folic acid 1 mg tablet   TK 1 T PO D      gabapentin (NEURONTIN) 300 MG capsule TAKE 1 C PO QD      HYDROcodone-acetaminophen 5-300 MG TABS Take by mouth 2 times daily as needed.      methenamine (HIPREX) 1 g tablet Take 1 tablet by mouth daily      omeprazole (PRILOSEC) 20 MG delayed release capsule TK 1 C PO D      predniSONE (DELTASONE) 5 MG tablet 8 tablets      tiZANidine (ZANAFLEX) 2 MG tablet TK 1 T PO HS PRF MUSCLE SPASM       No current facility-administered medications for this visit.        Allergies   Allergen Reactions    Povidone-Iodine      Other reaction(s): toxic skin reaction  When betadine applied with adhesive causes severe blisters.     Metformin And Related Diarrhea    Bactrim [Sulfamethoxazole-Trimethoprim] Rash     Heat rash     Iron Nausea And Vomiting     Only iron in pill form        Past Surgical History:   Procedure Laterality Date    ORTHOPEDIC SURGERY      left ankle    TRANSPLANT  2006    kidney    UROLOGICAL SURGERY      ureter implants       Social History     Socioeconomic History    Marital status: Single     Spouse name: Not on file    Number of children: Not on file    Years of education: Not on file    Highest education level: Not on file   Occupational History    Not on file   Tobacco Use    Smoking status: Never    Smokeless tobacco: Never   Substance and Sexual Activity    Alcohol use: Yes    Drug use: Not Currently    Sexual activity: Not Currently

## 2024-05-20 ENCOUNTER — OFFICE VISIT (OUTPATIENT)
Age: 49
End: 2024-05-20
Payer: MEDICARE

## 2024-05-20 DIAGNOSIS — M25.572 LEFT ANKLE PAIN, UNSPECIFIED CHRONICITY: ICD-10-CM

## 2024-05-20 DIAGNOSIS — M79.672 LEFT FOOT PAIN: ICD-10-CM

## 2024-05-20 DIAGNOSIS — Q05.9 SPINA BIFIDA WITHOUT HYDROCEPHALUS, UNSPECIFIED SPINAL REGION (HCC): Primary | ICD-10-CM

## 2024-05-20 PROCEDURE — 73630 X-RAY EXAM OF FOOT: CPT | Performed by: ORTHOPAEDIC SURGERY

## 2024-05-20 PROCEDURE — 1036F TOBACCO NON-USER: CPT | Performed by: ORTHOPAEDIC SURGERY

## 2024-05-20 PROCEDURE — G8427 DOCREV CUR MEDS BY ELIG CLIN: HCPCS | Performed by: ORTHOPAEDIC SURGERY

## 2024-05-20 PROCEDURE — 73600 X-RAY EXAM OF ANKLE: CPT | Performed by: ORTHOPAEDIC SURGERY

## 2024-05-20 PROCEDURE — G8417 CALC BMI ABV UP PARAM F/U: HCPCS | Performed by: ORTHOPAEDIC SURGERY

## 2024-05-20 PROCEDURE — 99214 OFFICE O/P EST MOD 30 MIN: CPT | Performed by: ORTHOPAEDIC SURGERY

## 2024-05-20 NOTE — PATIENT INSTRUCTIONS
[facility]: Prosthetics & Orthotics  [address]  [place] [address]  [phone]  - DME: left fixed AFO brace

## 2024-05-20 NOTE — PROGRESS NOTES
AMBULATORY PROGRESS NOTE      Patient: Nicole Grimes             MRN: [id number]     SSN: [id number] There is no height or weight on file to calculate BMI.  YOB: 1975     AGE: 49 y.o.       EX: female    PCP: Nicole Brooke APRN - CNP       IMPRESSION //  DIAGNOSIS AND TREATMENT PLAN      Nicole Grimes has a diagnosis of:      DIAGNOSES    1. Spina bifida without hydrocephalus, unspecified spinal region (HCC)    2. Left foot pain    3. Left ankle pain, unspecified chronicity          PLAN:    1. Write for new left fixed AFO brace from [facility]  2. Patient will follow up with Dr. Reyes regarding left hip replacement    RTO PRN    Orders Placed This Encounter    AFO Brace     Fixed AFO    [25409] Foot Min 3V     Order Specific Question:   Are you Pregnant?     Answer:   No    [35967] Ankle 2V     Order Specific Question:   Are you Pregnant?     Answer:   No        Patient Instructions   [facility]: Prosthetics & Orthotics  [address] [place] [address]  [phone]  - DME: left fixed AFO brace        Please follow up with your PCP for any health maintenance as recommended.         Nicole Grimes  expresses understanding of the diagnosis, treatment plan, and all of their proposed questions were answered to their satisfaction. Patient education has been provided re the diagnoses.         HPI //  OBJECTIVE EXAMINATION      Nicole Grimes IS A 49 y.o. female who is a/an  new patient, presenting to my outpatient office for evaluation of  the following chief complaint(s):     Chief Complaint   Patient presents with    Foot Pain     left       Patient presents with left foot drop, referred by Katy Barrientos PA-C.    She reports using an AFO brace her entire life, her current AFO brace was made 15 years ago in [facility]. She only has issues with the brace when placing too much pressure on her heel. She is scheduled for a left hip

## 2024-06-04 NOTE — PROGRESS NOTES
Pt requesting [facility] due to [facility] weight times.

## 2024-06-07 ENCOUNTER — OFFICE VISIT (OUTPATIENT)
Age: 49
End: 2024-06-07
Payer: MEDICARE

## 2024-06-07 DIAGNOSIS — E11.9 TYPE 2 DIABETES MELLITUS WITHOUT COMPLICATION, WITHOUT LONG-TERM CURRENT USE OF INSULIN (HCC): ICD-10-CM

## 2024-06-07 DIAGNOSIS — M16.32 OSTEOARTHRITIS RESULTING FROM LEFT HIP DYSPLASIA: ICD-10-CM

## 2024-06-07 DIAGNOSIS — E66.01 MORBID (SEVERE) OBESITY DUE TO EXCESS CALORIES (HCC): ICD-10-CM

## 2024-06-07 DIAGNOSIS — Q72.899 CONGENITAL LEG LENGTH INEQUALITY: Primary | ICD-10-CM

## 2024-06-07 PROCEDURE — 2022F DILAT RTA XM EVC RTNOPTHY: CPT | Performed by: ORTHOPAEDIC SURGERY

## 2024-06-07 PROCEDURE — 3046F HEMOGLOBIN A1C LEVEL >9.0%: CPT | Performed by: ORTHOPAEDIC SURGERY

## 2024-06-07 PROCEDURE — G8428 CUR MEDS NOT DOCUMENT: HCPCS | Performed by: ORTHOPAEDIC SURGERY

## 2024-06-07 PROCEDURE — 1036F TOBACCO NON-USER: CPT | Performed by: ORTHOPAEDIC SURGERY

## 2024-06-07 PROCEDURE — 99214 OFFICE O/P EST MOD 30 MIN: CPT | Performed by: ORTHOPAEDIC SURGERY

## 2024-06-07 PROCEDURE — G8417 CALC BMI ABV UP PARAM F/U: HCPCS | Performed by: ORTHOPAEDIC SURGERY

## 2024-06-07 NOTE — PROGRESS NOTES
(6/7/2024)    Patient History     Smoking Tobacco Use: Never     Smokeless Tobacco Use: Never     Passive Exposure: Not on file         Past Medical History:   Diagnosis Date    Chronic kidney disease     Chronic pain     Dialysis patient ([facility])     GERD (gastroesophageal reflux disease)     Hypercholesterolemia     Hypertension     Neurogenic bladder     Spina bifida ([facility])        Family History   Problem Relation Age of Onset    Lung Disease Mother     COPD Mother     Diabetes Father     Hypertension Father     Diabetes Mother        Current Outpatient Medications   Medication Sig Dispense Refill    neomycin-polymyxin-hydrocortisone (CORTISPORIN) 3.5-80871-9 otic suspension SHAKE LIQUID AND INSTILL 4 DROPS TO AFFECTED EAR FOUR TIMES DAILY FOR 5 TO 7 DAYS      atorvastatin (LIPITOR) 20 MG tablet Take 1 tablet by mouth daily at bedtime. 90 tablet 1    azaTHIOprine 100 MG TABS Take by mouth      VITAMIN D HIGH POTENCY 25 MCG (1000 UT) CAPS TAKE 1 CAPSULE BY MOUTH DAILY AS DIRECTED      cycloSPORINE (SANDIMMUNE) 25 MG capsule Take 1 capsule by mouth 2 times daily      cloNIDine (CATAPRES) 0.1 MG tablet TAKE 1 TABLET BY MOUTH EVERY DAY      DULoxetine (CYMBALTA) 60 MG extended release capsule Take 2 capsules by mouth      folic acid (FOLVITE) 1 MG tablet folic acid 1 mg tablet   TK 1 T PO D      gabapentin (NEURONTIN) 300 MG capsule TAKE 1 C PO QD      HYDROcodone-acetaminophen 5-300 MG TABS Take by mouth 2 times daily as needed.      methenamine (HIPREX) 1 g tablet Take 1 tablet by mouth daily      omeprazole (PRILOSEC) 20 MG delayed release capsule TK 1 C PO D      predniSONE (DELTASONE) 5 MG tablet 8 tablets      tiZANidine (ZANAFLEX) 2 MG tablet TK 1 T PO HS PRF MUSCLE SPASM       No current facility-administered medications for this visit.        Allergies   Allergen Reactions    Povidone-Iodine      Other reaction(s): toxic skin reaction  When betadine applied with adhesive causes severe blisters.     Metformin And

## 2024-06-21 LAB
A/G RATIO: 1.1 RATIO (ref 1.1–2.6)
ALBUMIN: 3.8 G/DL (ref 3.5–5)
ALP BLD-CCNC: 154 U/L (ref 25–115)
ALT SERPL-CCNC: 13 U/L (ref 5–40)
ANION GAP SERPL CALCULATED.3IONS-SCNC: 13 MMOL/L (ref 3–15)
AST SERPL-CCNC: 11 U/L (ref 10–37)
BILIRUB SERPL-MCNC: 0.4 MG/DL (ref 0.2–1.2)
BUN BLDV-MCNC: 21 MG/DL (ref 6–22)
CALCIUM SERPL-MCNC: 9.8 MG/DL (ref 8.4–10.5)
CHLORIDE BLD-SCNC: 104 MMOL/L (ref 98–110)
CHOLESTEROL, TOTAL: 161 MG/DL (ref 110–200)
CHOLESTEROL/HDL RATIO: 3.9 (ref 0–5)
CO2: 24 MMOL/L (ref 20–32)
CREAT SERPL-MCNC: 1.1 MG/DL (ref 0.5–1.2)
ESTIMATED AVERAGE GLUCOSE: 183 MG/DL (ref 91–123)
GFR, ESTIMATED: 58.9 ML/MIN/1.73 SQ.M.
GLOBULIN: 3.5 G/DL (ref 2–4)
GLUCOSE: 180 MG/DL (ref 70–99)
HBA1C MFR BLD: 8 % (ref 4.8–5.6)
HCT VFR BLD CALC: 39.1 % (ref 35.1–48)
HDLC SERPL-MCNC: 41 MG/DL
HEMOGLOBIN: 12 G/DL (ref 11.7–16)
LDL CHOLESTEROL: 82 MG/DL (ref 50–99)
LDL/HDL RATIO: 2
MCH RBC QN AUTO: 26 PG (ref 26–34)
MCHC RBC AUTO-ENTMCNC: 31 G/DL (ref 31–36)
MCV RBC AUTO: 86 FL (ref 80–99)
NON-HDL CHOLESTEROL: 120 MG/DL
PDW BLD-RTO: 16 % (ref 10–15.5)
PLATELET # BLD: 320 K/UL (ref 140–440)
PMV BLD AUTO: 10.3 FL (ref 9–13)
POTASSIUM SERPL-SCNC: 3.9 MMOL/L (ref 3.5–5.5)
RBC # BLD: 4.54 M/UL (ref 3.8–5.2)
SODIUM BLD-SCNC: 141 MMOL/L (ref 133–145)
TOTAL PROTEIN: 7.3 G/DL (ref 6.4–8.3)
TRIGL SERPL-MCNC: 189 MG/DL (ref 40–149)
VLDLC SERPL CALC-MCNC: 38 MG/DL (ref 8–30)
WBC # BLD: 9.6 K/UL (ref 4–11)

## 2024-06-27 ENCOUNTER — OFFICE VISIT (OUTPATIENT)
Facility: CLINIC | Age: 49
End: 2024-06-27
Payer: MEDICARE

## 2024-06-27 VITALS
TEMPERATURE: 97.8 F | WEIGHT: 290 LBS | SYSTOLIC BLOOD PRESSURE: 126 MMHG | RESPIRATION RATE: 18 BRPM | OXYGEN SATURATION: 97 % | DIASTOLIC BLOOD PRESSURE: 84 MMHG | BODY MASS INDEX: 51.38 KG/M2 | HEIGHT: 63 IN | HEART RATE: 103 BPM

## 2024-06-27 DIAGNOSIS — E11.22 TYPE 2 DIABETES MELLITUS WITH STAGE 3A CHRONIC KIDNEY DISEASE, WITHOUT LONG-TERM CURRENT USE OF INSULIN (HCC): ICD-10-CM

## 2024-06-27 DIAGNOSIS — Z99.3 WHEELCHAIR DEPENDENCE: ICD-10-CM

## 2024-06-27 DIAGNOSIS — E11.65 TYPE 2 DIABETES MELLITUS WITH HYPERGLYCEMIA, WITHOUT LONG-TERM CURRENT USE OF INSULIN (HCC): Primary | ICD-10-CM

## 2024-06-27 DIAGNOSIS — N18.31 TYPE 2 DIABETES MELLITUS WITH STAGE 3A CHRONIC KIDNEY DISEASE, WITHOUT LONG-TERM CURRENT USE OF INSULIN (HCC): ICD-10-CM

## 2024-06-27 DIAGNOSIS — F33.0 MILD EPISODE OF RECURRENT MAJOR DEPRESSIVE DISORDER (HCC): ICD-10-CM

## 2024-06-27 DIAGNOSIS — Z87.728 HISTORY OF SPINA BIFIDA: ICD-10-CM

## 2024-06-27 DIAGNOSIS — E66.01 CLASS 3 SEVERE OBESITY DUE TO EXCESS CALORIES WITH SERIOUS COMORBIDITY AND BODY MASS INDEX (BMI) OF 50.0 TO 59.9 IN ADULT (HCC): ICD-10-CM

## 2024-06-27 PROBLEM — E66.813 CLASS 3 SEVERE OBESITY DUE TO EXCESS CALORIES WITH SERIOUS COMORBIDITY AND BODY MASS INDEX (BMI) OF 50.0 TO 59.9 IN ADULT: Status: ACTIVE | Noted: 2024-06-27

## 2024-06-27 PROBLEM — E11.9 TYPE 2 DIABETES MELLITUS WITHOUT COMPLICATION, WITHOUT LONG-TERM CURRENT USE OF INSULIN (HCC): Status: RESOLVED | Noted: 2020-03-26 | Resolved: 2024-06-27

## 2024-06-27 PROCEDURE — 1036F TOBACCO NON-USER: CPT | Performed by: NURSE PRACTITIONER

## 2024-06-27 PROCEDURE — 2022F DILAT RTA XM EVC RTNOPTHY: CPT | Performed by: NURSE PRACTITIONER

## 2024-06-27 PROCEDURE — G8417 CALC BMI ABV UP PARAM F/U: HCPCS | Performed by: NURSE PRACTITIONER

## 2024-06-27 PROCEDURE — 3052F HG A1C>EQUAL 8.0%<EQUAL 9.0%: CPT | Performed by: NURSE PRACTITIONER

## 2024-06-27 PROCEDURE — 3074F SYST BP LT 130 MM HG: CPT | Performed by: NURSE PRACTITIONER

## 2024-06-27 PROCEDURE — 99214 OFFICE O/P EST MOD 30 MIN: CPT | Performed by: NURSE PRACTITIONER

## 2024-06-27 PROCEDURE — 3079F DIAST BP 80-89 MM HG: CPT | Performed by: NURSE PRACTITIONER

## 2024-06-27 PROCEDURE — G8427 DOCREV CUR MEDS BY ELIG CLIN: HCPCS | Performed by: NURSE PRACTITIONER

## 2024-06-27 RX ORDER — DULOXETIN HYDROCHLORIDE 60 MG/1
120 CAPSULE, DELAYED RELEASE ORAL DAILY
Qty: 180 CAPSULE | Refills: 1 | Status: SHIPPED | OUTPATIENT
Start: 2024-06-27

## 2024-06-27 RX ORDER — DULOXETIN HYDROCHLORIDE 60 MG/1
120 CAPSULE, DELAYED RELEASE ORAL DAILY
Qty: 90 CAPSULE | Refills: 1 | Status: SHIPPED | OUTPATIENT
Start: 2024-06-27 | End: 2024-06-27 | Stop reason: DRUGHIGH

## 2024-06-27 RX ORDER — CALCIUM CARBONATE 500(1250)
500 TABLET ORAL DAILY
COMMUNITY

## 2024-06-27 RX ORDER — SEMAGLUTIDE 0.68 MG/ML
0.25 INJECTION, SOLUTION SUBCUTANEOUS
Qty: 9 ML | Refills: 0 | Status: SHIPPED | OUTPATIENT
Start: 2024-06-27

## 2024-06-27 ASSESSMENT — PATIENT HEALTH QUESTIONNAIRE - PHQ9
6. FEELING BAD ABOUT YOURSELF - OR THAT YOU ARE A FAILURE OR HAVE LET YOURSELF OR YOUR FAMILY DOWN: NOT AT ALL
5. POOR APPETITE OR OVEREATING: MORE THAN HALF THE DAYS
SUM OF ALL RESPONSES TO PHQ9 QUESTIONS 1 & 2: 0
7. TROUBLE CONCENTRATING ON THINGS, SUCH AS READING THE NEWSPAPER OR WATCHING TELEVISION: NOT AT ALL
SUM OF ALL RESPONSES TO PHQ QUESTIONS 1-9: 6
SUM OF ALL RESPONSES TO PHQ QUESTIONS 1-9: 6
4. FEELING TIRED OR HAVING LITTLE ENERGY: SEVERAL DAYS
9. THOUGHTS THAT YOU WOULD BE BETTER OFF DEAD, OR OF HURTING YOURSELF: NOT AT ALL
SUM OF ALL RESPONSES TO PHQ QUESTIONS 1-9: 6
SUM OF ALL RESPONSES TO PHQ QUESTIONS 1-9: 6
2. FEELING DOWN, DEPRESSED OR HOPELESS: NOT AT ALL
3. TROUBLE FALLING OR STAYING ASLEEP: NEARLY EVERY DAY
8. MOVING OR SPEAKING SO SLOWLY THAT OTHER PEOPLE COULD HAVE NOTICED. OR THE OPPOSITE, BEING SO FIGETY OR RESTLESS THAT YOU HAVE BEEN MOVING AROUND A LOT MORE THAN USUAL: NOT AT ALL
10. IF YOU CHECKED OFF ANY PROBLEMS, HOW DIFFICULT HAVE THESE PROBLEMS MADE IT FOR YOU TO DO YOUR WORK, TAKE CARE OF THINGS AT HOME, OR GET ALONG WITH OTHER PEOPLE: NOT DIFFICULT AT ALL
1. LITTLE INTEREST OR PLEASURE IN DOING THINGS: NOT AT ALL

## 2024-06-27 NOTE — PROGRESS NOTES
Room 9      Nicole Grimes had concerns including Follow-up Chronic Condition, Hypertension, Diabetes, and Cholesterol Problem. for today's visit .     When asked if patient has any concerns she/he would like to address with ELIAS Brooke . ELIAS Brooke has been notified  of patient concerns .    Did patient bring someone? NO    Did the patient have DME equipment? Yes autimatic wheel chair     Did you take your medication today? Pt states yes I take medictions at night       1. \"Have you been to the ER, urgent care clinic since your last visit?  Hospitalized since your last visit?\" .NO    2. \"Have you seen or consulted any other health care providers outside of the [facility] since your last visit?\" Yes Ortho     3. For patients aged 45-75: Has the patient had a colonoscopy / FIT/ Cologuard? Yes      If the patient is female:    4. For patients aged 40-74: Has the patient had a mammogram within the past 2 years? Yes       5. For patients aged 21-65: Has the patient had a pap smear? {Cancer Care Gap present? Pt has appointment for pap           2/27/2024     3:45 PM   Amb Fall Risk Assessment and TUG Test   Do you feel unsteady or are you worried about falling?  no   2 or more falls in past year? yes   Fall with injury in past year? no              6/27/2024     1:55 PM   PHQ-9    Little interest or pleasure in doing things 0   Feeling down, depressed, or hopeless 0   Trouble falling or staying asleep, or sleeping too much 3   Feeling tired or having little energy 1   Poor appetite or overeating 2   Feeling bad about yourself - or that you are a failure or have let yourself or your family down 0   Trouble concentrating on things, such as reading the newspaper or watching television 0   Moving or speaking so slowly that other people could have noticed. Or the opposite - being so fidgety or restless that you have been moving around a lot more than usual 0   Thoughts that you would be better off dead, or

## 2024-06-27 NOTE — PROGRESS NOTES
[address] [place] [address]               [phone]      Nicole Grimes is a 49 y.o. female and presents with Gynecologic Exam, Follow-up Chronic Condition, Hypertension, Diabetes, and Cholesterol Problem       Assessment/Plan:    1. Type 2 diabetes mellitus with hyperglycemia, without long-term current use of insulin (HCC)  -     Semaglutide,0.25 or 0.5MG/DOS, (OZEMPIC, 0.25 OR 0.5 MG/DOSE,) 2 MG/3ML SOPN; Inject 0.25 mg into the skin every 7 days, Disp-9 mL, R-0Normal  -     Comprehensive Metabolic Panel; Future  -     Hemoglobin A1C; Future  2. Class 3 severe obesity due to excess calories with serious comorbidity and body mass index (BMI) of 50.0 to 59.9 in adult ([facility])  3. History of spina bifida  -     External Referral To Physical Therapy  4. Wheelchair dependence  -     External Referral To Physical Therapy  5. Mild episode of recurrent major depressive disorder (HCC)  -     DULoxetine (CYMBALTA) 60 MG extended release capsule; Take 2 capsules by mouth daily, Disp-180 capsule, R-1Normal  6. Type 2 diabetes mellitus with stage 3a chronic kidney disease, without long-term current use of insulin ([facility])  Assessment & Plan:   Well-controlled, continue current treatment plan  DM uncontrolled, A1C 8.0%, start ozempic, intolerant to metformin  Currently attending [facility] surgical weight loss program  Currently primarily wheelchair bound, would like PT to help with mobility and strengthening  Depression managed by [facility], refill provided  Labs prior to next visit  Patient verbalized understanding and is in agreement with this plan of care             Follow up and disposition:   Return in about 3 months (around 9/27/2024) for DM, 15min, office, w/labsprior.      Subjective:    Labs obtained prior to visit? Yes  Reviewed with patient? yes    DMII-   Patient reports medication compliance  managed with diet  Diabetic diet compliance seldom,has been drinking sweet

## 2024-07-01 ENCOUNTER — TELEPHONE (OUTPATIENT)
Age: 49
End: 2024-07-01

## 2024-07-01 NOTE — TELEPHONE ENCOUNTER
Patient called for .    Patient is asking that the Referral from  ,for  , be re-faxed.     Dr. Pink   Fax # [phone]    Patient tel. [phone]

## 2024-08-05 DIAGNOSIS — Z87.728 HISTORY OF SPINA BIFIDA: Primary | ICD-10-CM

## 2024-08-05 DIAGNOSIS — Z99.3 WHEELCHAIR DEPENDENCE: ICD-10-CM

## 2024-09-17 ENCOUNTER — HOSPITAL ENCOUNTER (OUTPATIENT)
Facility: HOSPITAL | Age: 49
Setting detail: RECURRING SERIES
Discharge: HOME | End: 2024-09-20
Payer: MEDICARE

## 2024-09-17 PROCEDURE — 97162 PT EVAL MOD COMPLEX 30 MIN: CPT

## 2024-09-26 ENCOUNTER — OFFICE VISIT (OUTPATIENT)
Facility: CLINIC | Age: 49
End: 2024-09-26
Payer: MEDICARE

## 2024-09-26 VITALS
RESPIRATION RATE: 16 BRPM | HEART RATE: 105 BPM | SYSTOLIC BLOOD PRESSURE: 103 MMHG | HEIGHT: 63 IN | BODY MASS INDEX: 48.55 KG/M2 | OXYGEN SATURATION: 97 % | DIASTOLIC BLOOD PRESSURE: 75 MMHG | WEIGHT: 274 LBS | TEMPERATURE: 98 F

## 2024-09-26 DIAGNOSIS — Z87.728 HISTORY OF SPINA BIFIDA: ICD-10-CM

## 2024-09-26 DIAGNOSIS — R29.898 WEAKNESS OF BOTH LOWER EXTREMITIES: ICD-10-CM

## 2024-09-26 DIAGNOSIS — T14.8XXA OPEN WOUND: ICD-10-CM

## 2024-09-26 DIAGNOSIS — E11.65 TYPE 2 DIABETES MELLITUS WITH HYPERGLYCEMIA, WITHOUT LONG-TERM CURRENT USE OF INSULIN (HCC): Primary | ICD-10-CM

## 2024-09-26 PROCEDURE — G8427 DOCREV CUR MEDS BY ELIG CLIN: HCPCS | Performed by: NURSE PRACTITIONER

## 2024-09-26 PROCEDURE — G8417 CALC BMI ABV UP PARAM F/U: HCPCS | Performed by: NURSE PRACTITIONER

## 2024-09-26 PROCEDURE — 3074F SYST BP LT 130 MM HG: CPT | Performed by: NURSE PRACTITIONER

## 2024-09-26 PROCEDURE — 99214 OFFICE O/P EST MOD 30 MIN: CPT | Performed by: NURSE PRACTITIONER

## 2024-09-26 PROCEDURE — 3052F HG A1C>EQUAL 8.0%<EQUAL 9.0%: CPT | Performed by: NURSE PRACTITIONER

## 2024-09-26 PROCEDURE — 3078F DIAST BP <80 MM HG: CPT | Performed by: NURSE PRACTITIONER

## 2024-09-26 PROCEDURE — 2022F DILAT RTA XM EVC RTNOPTHY: CPT | Performed by: NURSE PRACTITIONER

## 2024-09-26 PROCEDURE — 1036F TOBACCO NON-USER: CPT | Performed by: NURSE PRACTITIONER

## 2024-09-26 RX ORDER — SEMAGLUTIDE 1.34 MG/ML
1 INJECTION, SOLUTION SUBCUTANEOUS
Qty: 9 ML | Refills: 0 | Status: SHIPPED | OUTPATIENT
Start: 2024-09-26 | End: 2024-10-03 | Stop reason: SDUPTHER

## 2024-09-26 ASSESSMENT — PATIENT HEALTH QUESTIONNAIRE - PHQ9
9. THOUGHTS THAT YOU WOULD BE BETTER OFF DEAD, OR OF HURTING YOURSELF: NOT AT ALL
7. TROUBLE CONCENTRATING ON THINGS, SUCH AS READING THE NEWSPAPER OR WATCHING TELEVISION: NOT AT ALL
SUM OF ALL RESPONSES TO PHQ9 QUESTIONS 1 & 2: 0
10. IF YOU CHECKED OFF ANY PROBLEMS, HOW DIFFICULT HAVE THESE PROBLEMS MADE IT FOR YOU TO DO YOUR WORK, TAKE CARE OF THINGS AT HOME, OR GET ALONG WITH OTHER PEOPLE: SOMEWHAT DIFFICULT
SUM OF ALL RESPONSES TO PHQ QUESTIONS 1-9: 5
6. FEELING BAD ABOUT YOURSELF - OR THAT YOU ARE A FAILURE OR HAVE LET YOURSELF OR YOUR FAMILY DOWN: NOT AT ALL
SUM OF ALL RESPONSES TO PHQ QUESTIONS 1-9: 5
SUM OF ALL RESPONSES TO PHQ QUESTIONS 1-9: 5
5. POOR APPETITE OR OVEREATING: NOT AT ALL
3. TROUBLE FALLING OR STAYING ASLEEP: NEARLY EVERY DAY
4. FEELING TIRED OR HAVING LITTLE ENERGY: MORE THAN HALF THE DAYS
8. MOVING OR SPEAKING SO SLOWLY THAT OTHER PEOPLE COULD HAVE NOTICED. OR THE OPPOSITE, BEING SO FIGETY OR RESTLESS THAT YOU HAVE BEEN MOVING AROUND A LOT MORE THAN USUAL: NOT AT ALL
SUM OF ALL RESPONSES TO PHQ QUESTIONS 1-9: 5
2. FEELING DOWN, DEPRESSED OR HOPELESS: NOT AT ALL

## 2024-09-26 NOTE — PROGRESS NOTES
Nicole Grimes is a 49 y.o. female (: 1975) presenting to address:    Chief Complaint   Patient presents with    Follow-up       Vitals:    24 1424   BP: 103/75   Pulse: (!) 105   Resp: 16   Temp: 98 °F (36.7 °C)   SpO2: 97%       Coordination of Care Questionaire:   1. \"Have you been to the ER, urgent care clinic since your last visit?  Hospitalized since your last visit?\" No     2. \"Have you seen or consulted any other health care providers outside of the  since your last visit?\" Yes     3. For patients aged 45-75: Has the patient had a colonoscopy / FIT/ Cologuard?Yes      If the patient is female:    4. For patients aged 40-74: Has the patient had a mammogram within the past 2 years? No      5. For patients aged 21-65: Has the patient had a pap smear? No    Advanced Directive:   1. Do you have an Advanced Directive? No    2. Would you like information on Advanced Directives? No  
DULoxetine (CYMBALTA) 60 MG extended release capsule Take 2 capsules by mouth daily 180 capsule 1    neomycin-polymyxin-hydrocortisone (CORTISPORIN) 3.5-37995-2 otic suspension SHAKE LIQUID AND INSTILL 4 DROPS TO AFFECTED EAR FOUR TIMES DAILY FOR 5 TO 7 DAYS (Patient not taking: Reported on 6/27/2024)      atorvastatin (LIPITOR) 20 MG tablet Take 1 tablet by mouth daily at bedtime. 90 tablet 1    azaTHIOprine 100 MG TABS Take by mouth      VITAMIN D HIGH POTENCY 25 MCG (1000 UT) CAPS TAKE 1 CAPSULE BY MOUTH DAILY AS DIRECTED      cycloSPORINE (SANDIMMUNE) 25 MG capsule Take 1 capsule by mouth 2 times daily      cloNIDine (CATAPRES) 0.1 MG tablet TAKE 1 TABLET BY MOUTH EVERY DAY      folic acid (FOLVITE) 1 MG tablet folic acid 1 mg tablet   TK 1 T PO D      gabapentin (NEURONTIN) 300 MG capsule TAKE 1 C PO QD      HYDROcodone-acetaminophen 5-300 MG TABS Take by mouth 2 times daily as needed.      methenamine (HIPREX) 1 g tablet Take 1 tablet by mouth daily      omeprazole (PRILOSEC) 20 MG delayed release capsule TK 1 C PO D      tiZANidine (ZANAFLEX) 2 MG tablet TK 1 T PO HS PRF MUSCLE SPASM       No current facility-administered medications on file prior to visit.        Allergies   Allergen Reactions    Povidone-Iodine      Other reaction(s): toxic skin reaction  When betadine applied with adhesive causes severe blisters.     Metformin And Related Diarrhea    Bactrim [Sulfamethoxazole-Trimethoprim] Rash     Heat rash     Iron Nausea And Vomiting     Only iron in pill form        Objective:  /75 (Site: Right Upper Arm, Position: Sitting, Cuff Size: Large Adult)   Pulse (!) 105   Temp 98 °F (36.7 °C) (Temporal)   Resp 16   Ht 1.6 m (5' 3\")   Wt 124.3 kg (274 lb)   SpO2 97%   BMI 48.54 kg/m²  Body mass index is 48.54 kg/m².    Physical assessment  Physical Exam            I have discussed the diagnosis with the patient and the intended plan as seen in the above orders.  The patient has received an

## 2024-09-30 ENCOUNTER — TELEPHONE (OUTPATIENT)
Facility: CLINIC | Age: 49
End: 2024-09-30

## 2024-09-30 NOTE — TELEPHONE ENCOUNTER
Anahi Glover,    Mrs. Grimes needs a refill on this medication Semaglutide 1 MG/DOSE (OZEMPIC, 1 MG/DOSE) 4 MG MG/3ML SOPN sc injection. Thanks

## 2024-10-03 DIAGNOSIS — E11.65 TYPE 2 DIABETES MELLITUS WITH HYPERGLYCEMIA, WITHOUT LONG-TERM CURRENT USE OF INSULIN (HCC): ICD-10-CM

## 2024-10-05 RX ORDER — SEMAGLUTIDE 1.34 MG/ML
1 INJECTION, SOLUTION SUBCUTANEOUS
Qty: 9 ML | Refills: 0 | Status: SHIPPED | OUTPATIENT
Start: 2024-10-05

## 2024-10-29 DIAGNOSIS — E78.00 HYPERCHOLESTEROLEMIA: ICD-10-CM

## 2024-10-30 RX ORDER — ATORVASTATIN CALCIUM 20 MG/1
20 TABLET, FILM COATED ORAL DAILY
Qty: 90 TABLET | Refills: 1 | Status: SHIPPED | OUTPATIENT
Start: 2024-10-30

## 2024-11-06 ENCOUNTER — TELEMEDICINE (OUTPATIENT)
Facility: CLINIC | Age: 49
End: 2024-11-06

## 2024-11-06 DIAGNOSIS — F51.01 PRIMARY INSOMNIA: Primary | ICD-10-CM

## 2024-11-06 DIAGNOSIS — Z12.31 ENCOUNTER FOR SCREENING MAMMOGRAM FOR MALIGNANT NEOPLASM OF BREAST: ICD-10-CM

## 2024-11-06 RX ORDER — TRAZODONE HYDROCHLORIDE 50 MG/1
25-50 TABLET, FILM COATED ORAL NIGHTLY
Qty: 90 TABLET | Refills: 1 | Status: SHIPPED | OUTPATIENT
Start: 2024-11-06

## 2024-11-06 NOTE — PROGRESS NOTES
Please use this number [phone]     Nicole Grimes had concerns including Follow-up Chronic Condition, Hypertension, and Diabetes. for today's visit .     When asked if patient has any concerns she would like to address with ELIAS Brooke . ELIAS Brooke has been notified  of patient concerns .      1. \"Have you been to the ER, urgent care clinic since your last visit?  Hospitalized since your last visit?\" .NO    2. \"Have you seen or consulted any other health care providers outside of the [facility] since your last visit?\" NO     3. For patients aged 45-75: Has the patient had a colonoscopy / FIT/ Cologuard? Yes      If the patient is female:    4. For patients aged 40-74: Has the patient had a mammogram within the past 2 years? YES       5. For patients aged 21-65: Has the patient had a pap smear? {Cancer Care Gap present? N/A            2/27/2024     3:45 PM   Amb Fall Risk Assessment and TUG Test   Do you feel unsteady or are you worried about falling?  no   2 or more falls in past year? yes   Fall with injury in past year? no              9/26/2024     2:20 PM   PHQ-9    Little interest or pleasure in doing things 0   Feeling down, depressed, or hopeless 0   Trouble falling or staying asleep, or sleeping too much 3   Feeling tired or having little energy 2   Poor appetite or overeating 0   Feeling bad about yourself - or that you are a failure or have let yourself or your family down 0   Trouble concentrating on things, such as reading the newspaper or watching television 0   Moving or speaking so slowly that other people could have noticed. Or the opposite - being so fidgety or restless that you have been moving around a lot more than usual 0   Thoughts that you would be better off dead, or of hurting yourself in some way 0   PHQ-2 Score 0   PHQ-9 Total Score 5   If you checked off any problems, how difficult have these problems made it for you to do your work, take care of things at home, 
otherwise all others negative.       Objective   Patient-Reported Vitals  No data recorded     Physical Exam             --Nicole Brooke, APRN - CNP

## 2024-11-06 NOTE — ASSESSMENT & PLAN NOTE
New, not at goal (unstable), start trazodone to assist with her insomnia    Orders:    traZODone (DESYREL) 50 MG tablet; Take 0.5-1 tablets by mouth nightly

## 2024-11-15 ENCOUNTER — TELEPHONE (OUTPATIENT)
Facility: CLINIC | Age: 49
End: 2024-11-15

## 2024-11-15 NOTE — TELEPHONE ENCOUNTER
Mrs. Rose called from [facility] Plan of Care wanted to know about some paperwork to be signed and faxed for Mrs. Grimes to their office. Will you please give Mrs. Rose a call? Thanks    (P) [phone]  (F) [phone]

## 2024-11-19 DIAGNOSIS — E11.65 TYPE 2 DIABETES MELLITUS WITH HYPERGLYCEMIA, WITHOUT LONG-TERM CURRENT USE OF INSULIN (HCC): ICD-10-CM

## 2024-11-21 RX ORDER — SEMAGLUTIDE 1.34 MG/ML
INJECTION, SOLUTION SUBCUTANEOUS
Qty: 9 ML | Refills: 0 | Status: SHIPPED | OUTPATIENT
Start: 2024-11-21

## 2024-11-25 DIAGNOSIS — E11.65 TYPE 2 DIABETES MELLITUS WITH HYPERGLYCEMIA, WITHOUT LONG-TERM CURRENT USE OF INSULIN (HCC): ICD-10-CM

## 2024-11-26 RX ORDER — SEMAGLUTIDE 1.34 MG/ML
INJECTION, SOLUTION SUBCUTANEOUS
Qty: 9 ML | Refills: 0 | OUTPATIENT
Start: 2024-11-26

## 2024-12-25 DIAGNOSIS — F33.0 MILD EPISODE OF RECURRENT MAJOR DEPRESSIVE DISORDER (HCC): ICD-10-CM

## 2024-12-27 RX ORDER — DULOXETIN HYDROCHLORIDE 60 MG/1
CAPSULE, DELAYED RELEASE ORAL
Qty: 180 CAPSULE | Refills: 1 | Status: SHIPPED | OUTPATIENT
Start: 2024-12-27

## 2025-01-10 ENCOUNTER — HOSPITAL ENCOUNTER (OUTPATIENT)
Facility: HOSPITAL | Age: 50
Setting detail: SPECIMEN
Discharge: HOME | End: 2025-01-13
Payer: MEDICARE

## 2025-01-10 DIAGNOSIS — E11.65 TYPE 2 DIABETES MELLITUS WITH HYPERGLYCEMIA, WITHOUT LONG-TERM CURRENT USE OF INSULIN (HCC): ICD-10-CM

## 2025-01-10 LAB
ALBUMIN SERPL-MCNC: 3.1 G/DL (ref 3.4–5)
ALBUMIN/GLOB SERPL: 0.8 (ref 0.8–1.7)
ALP SERPL-CCNC: 141 U/L (ref 45–117)
ALT SERPL-CCNC: 18 U/L (ref 13–56)
ANION GAP SERPL CALC-SCNC: 4 MMOL/L (ref 3–18)
AST SERPL-CCNC: 6 U/L (ref 10–38)
BILIRUB SERPL-MCNC: 0.5 MG/DL (ref 0.2–1)
BUN SERPL-MCNC: 23 MG/DL (ref 7–18)
BUN/CREAT SERPL: 16 (ref 12–20)
CALCIUM SERPL-MCNC: 9 MG/DL (ref 8.5–10.1)
CHLORIDE SERPL-SCNC: 105 MMOL/L (ref 100–111)
CHOLEST SERPL-MCNC: 147 MG/DL
CO2 SERPL-SCNC: 30 MMOL/L (ref 21–32)
CREAT SERPL-MCNC: 1.4 MG/DL (ref 0.6–1.3)
EST. AVERAGE GLUCOSE BLD GHB EST-MCNC: 140 MG/DL
GLOBULIN SER CALC-MCNC: 3.8 G/DL (ref 2–4)
GLUCOSE SERPL-MCNC: 137 MG/DL (ref 74–99)
HBA1C MFR BLD: 6.5 % (ref 4.2–5.6)
HDLC SERPL-MCNC: 38 MG/DL (ref 40–60)
HDLC SERPL: 3.9 (ref 0–5)
LDLC SERPL CALC-MCNC: 74.2 MG/DL (ref 0–100)
LIPID PANEL: ABNORMAL
POTASSIUM SERPL-SCNC: 4 MMOL/L (ref 3.5–5.5)
PROT SERPL-MCNC: 6.9 G/DL (ref 6.4–8.2)
SODIUM SERPL-SCNC: 139 MMOL/L (ref 136–145)
TRIGL SERPL-MCNC: 174 MG/DL
VLDLC SERPL CALC-MCNC: 34.8 MG/DL

## 2025-01-10 PROCEDURE — 80061 LIPID PANEL: CPT

## 2025-01-10 PROCEDURE — 83036 HEMOGLOBIN GLYCOSYLATED A1C: CPT

## 2025-01-10 PROCEDURE — 80053 COMPREHEN METABOLIC PANEL: CPT

## 2025-01-10 PROCEDURE — 36415 COLL VENOUS BLD VENIPUNCTURE: CPT

## 2025-01-16 ENCOUNTER — OFFICE VISIT (OUTPATIENT)
Facility: CLINIC | Age: 50
End: 2025-01-16

## 2025-01-16 VITALS
OXYGEN SATURATION: 96 % | WEIGHT: 255 LBS | SYSTOLIC BLOOD PRESSURE: 114 MMHG | BODY MASS INDEX: 45.18 KG/M2 | DIASTOLIC BLOOD PRESSURE: 80 MMHG | TEMPERATURE: 97.3 F | HEIGHT: 63 IN | RESPIRATION RATE: 18 BRPM | HEART RATE: 101 BPM

## 2025-01-16 DIAGNOSIS — L97.511 DIABETIC ULCER OF TOE OF RIGHT FOOT ASSOCIATED WITH TYPE 2 DIABETES MELLITUS, LIMITED TO BREAKDOWN OF SKIN (HCC): ICD-10-CM

## 2025-01-16 DIAGNOSIS — E11.621 DIABETIC ULCER OF TOE OF RIGHT FOOT ASSOCIATED WITH TYPE 2 DIABETES MELLITUS, LIMITED TO BREAKDOWN OF SKIN (HCC): ICD-10-CM

## 2025-01-16 DIAGNOSIS — Z23 NEED FOR INFLUENZA VACCINATION: ICD-10-CM

## 2025-01-16 DIAGNOSIS — E11.22 TYPE 2 DIABETES MELLITUS WITH STAGE 3A CHRONIC KIDNEY DISEASE, WITHOUT LONG-TERM CURRENT USE OF INSULIN (HCC): Primary | ICD-10-CM

## 2025-01-16 DIAGNOSIS — Q05.9 SPINA BIFIDA WITHOUT HYDROCEPHALUS, UNSPECIFIED SPINAL REGION (HCC): ICD-10-CM

## 2025-01-16 DIAGNOSIS — Z11.59 NEED FOR HEPATITIS C SCREENING TEST: ICD-10-CM

## 2025-01-16 DIAGNOSIS — N18.31 TYPE 2 DIABETES MELLITUS WITH STAGE 3A CHRONIC KIDNEY DISEASE, WITHOUT LONG-TERM CURRENT USE OF INSULIN (HCC): Primary | ICD-10-CM

## 2025-01-16 PROBLEM — E11.65 TYPE 2 DIABETES MELLITUS WITH HYPERGLYCEMIA, WITHOUT LONG-TERM CURRENT USE OF INSULIN (HCC): Status: RESOLVED | Noted: 2024-06-27 | Resolved: 2025-01-16

## 2025-01-16 RX ORDER — DOXYCYCLINE HYCLATE 100 MG
100 TABLET ORAL 2 TIMES DAILY
Qty: 14 TABLET | Refills: 0 | Status: SHIPPED | OUTPATIENT
Start: 2025-01-16 | End: 2025-01-23

## 2025-01-16 RX ORDER — TACROLIMUS 1 MG/1
1 TABLET, EXTENDED RELEASE ORAL DAILY
COMMUNITY
Start: 2025-01-15

## 2025-01-16 SDOH — ECONOMIC STABILITY: FOOD INSECURITY: WITHIN THE PAST 12 MONTHS, YOU WORRIED THAT YOUR FOOD WOULD RUN OUT BEFORE YOU GOT MONEY TO BUY MORE.: NEVER TRUE

## 2025-01-16 SDOH — ECONOMIC STABILITY: FOOD INSECURITY: WITHIN THE PAST 12 MONTHS, THE FOOD YOU BOUGHT JUST DIDN'T LAST AND YOU DIDN'T HAVE MONEY TO GET MORE.: NEVER TRUE

## 2025-01-16 ASSESSMENT — PATIENT HEALTH QUESTIONNAIRE - PHQ9
9. THOUGHTS THAT YOU WOULD BE BETTER OFF DEAD, OR OF HURTING YOURSELF: NOT AT ALL
5. POOR APPETITE OR OVEREATING: NOT AT ALL
4. FEELING TIRED OR HAVING LITTLE ENERGY: SEVERAL DAYS
6. FEELING BAD ABOUT YOURSELF - OR THAT YOU ARE A FAILURE OR HAVE LET YOURSELF OR YOUR FAMILY DOWN: NOT AT ALL
SUM OF ALL RESPONSES TO PHQ QUESTIONS 1-9: 7
1. LITTLE INTEREST OR PLEASURE IN DOING THINGS: SEVERAL DAYS
SUM OF ALL RESPONSES TO PHQ QUESTIONS 1-9: 7
SUM OF ALL RESPONSES TO PHQ9 QUESTIONS 1 & 2: 4
8. MOVING OR SPEAKING SO SLOWLY THAT OTHER PEOPLE COULD HAVE NOTICED. OR THE OPPOSITE, BEING SO FIGETY OR RESTLESS THAT YOU HAVE BEEN MOVING AROUND A LOT MORE THAN USUAL: NOT AT ALL
3. TROUBLE FALLING OR STAYING ASLEEP: SEVERAL DAYS
SUM OF ALL RESPONSES TO PHQ QUESTIONS 1-9: 7
SUM OF ALL RESPONSES TO PHQ QUESTIONS 1-9: 7
2. FEELING DOWN, DEPRESSED OR HOPELESS: NEARLY EVERY DAY
7. TROUBLE CONCENTRATING ON THINGS, SUCH AS READING THE NEWSPAPER OR WATCHING TELEVISION: SEVERAL DAYS
10. IF YOU CHECKED OFF ANY PROBLEMS, HOW DIFFICULT HAVE THESE PROBLEMS MADE IT FOR YOU TO DO YOUR WORK, TAKE CARE OF THINGS AT HOME, OR GET ALONG WITH OTHER PEOPLE: NOT DIFFICULT AT ALL

## 2025-01-16 NOTE — PROGRESS NOTES
[address] [place] [address]               [phone]      Nicole Grimes is a 49 y.o. female and presents with Follow-up Chronic Condition and Diabetes       Assessment/Plan:    1. Type 2 diabetes mellitus with stage 3a chronic kidney disease, without long-term current use of insulin (HCC)  -     Comprehensive Metabolic Panel; Future  -     Lipid Panel; Future  -     Hemoglobin A1C; Future  -     Albumin/Creatinine Ratio, Urine; Future  -     CBC; Future  2. Diabetic ulcer of toe of right foot associated with type 2 diabetes mellitus, limited to breakdown of skin (HCC)  -     External Referral To Podiatry  -     doxycycline hyclate (VIBRA-TABS) 100 MG tablet; Take 1 tablet by mouth 2 times daily for 7 days, Disp-14 tablet, R-0Normal  3. Spina bifida without hydrocephalus, unspecified spinal region (HCC)  Assessment & Plan:   Chronic, at goal (stable), continue current treatment plan  4. Need for influenza vaccination  -     Influenza, FLUCELVAX Trivalent, (age 6 mo+) IM, Preservative Free, 0.5mL  5. Need for hepatitis C screening test  -     Hepatitis C Ab, Rflx to Qt by PCR; Future   DM controlled, A1C 6.5%  Wound to right foot great toe, Rx for ABX and refer to podiatry  Health maintenance addressed  Labs prior to next visit  Patient verbalized understanding and is in agreement with this plan of care       Follow up and disposition:   Return in about 4 months (around 5/16/2025) for MAWV, DM, 30min, office, w/labsprior.      Subjective:    Labs obtained prior to visit? Yes  Reviewed with patient? yes    DMII-   Patient reports medication compliance Yes  Diabetic diet compliance frequently  Patient monitors blood sugars regularly never   Home glucose readings: does not check   Denies hypoglycemic episodes Yes  Denies polyuria, polydipsia, paraesthesia, vision changes? Yes  Engaging in daily exercise? Routine     Diabetes Management   Topic Date Due    Diabetic foot exam

## 2025-01-16 NOTE — PROGRESS NOTES
Room 7      Nicole Grimes had concerns including Follow-up Chronic Condition and Diabetes. for today's visit .     After obtaining verbal  consent, and per orders of ELIAS Brooke , injection of Fluclevax vaccine  given in Right arm by Belen Hamilton CMA . Patient instructed to remain in clinic for 20 minutes afterwards, and to report any adverse reaction to me immediately.      1. \"Have you been to the ER, urgent care clinic since your last visit?  Hospitalized since your last visit?\" .NO    2. \"Have you seen or consulted any other health care providers outside of the [facility] since your last visit?\" Pt states I have been to weight loos clinic.    3. For patients aged 45-75: Has the patient had a colonoscopy / FIT/ Cologuard? Yes      If the patient is female:    4. For patients aged 40-74: Has the patient had a mammogram within the past 2 years? Order has been placed       5. For patients aged 21-65: Has the patient had a pap smear? {Cancer Care Gap present? N/A            1/16/2025     4:30 PM   PHQ-9    Little interest or pleasure in doing things 1   Feeling down, depressed, or hopeless 3   Trouble falling or staying asleep, or sleeping too much 1   Feeling tired or having little energy 1   Poor appetite or overeating 0   Feeling bad about yourself - or that you are a failure or have let yourself or your family down 0   Trouble concentrating on things, such as reading the newspaper or watching television 1   Moving or speaking so slowly that other people could have noticed. Or the opposite - being so fidgety or restless that you have been moving around a lot more than usual 0   Thoughts that you would be better off dead, or of hurting yourself in some way 0   PHQ-2 Score 4   PHQ-9 Total Score 7   If you checked off any problems, how difficult have these problems made it for you to do your work, take care of things at home, or get along with other people? 0          Health Maintenance Due

## 2025-02-11 DIAGNOSIS — E11.65 TYPE 2 DIABETES MELLITUS WITH HYPERGLYCEMIA, WITHOUT LONG-TERM CURRENT USE OF INSULIN (HCC): ICD-10-CM

## 2025-02-11 RX ORDER — SEMAGLUTIDE 1.34 MG/ML
1 INJECTION, SOLUTION SUBCUTANEOUS
Qty: 9 ML | Refills: 1 | Status: SHIPPED | OUTPATIENT
Start: 2025-02-11

## 2025-04-02 ENCOUNTER — HOSPITAL ENCOUNTER (OUTPATIENT)
Facility: HOSPITAL | Age: 50
Setting detail: SPECIMEN
Discharge: HOME | End: 2025-04-05
Payer: MEDICARE

## 2025-04-02 ENCOUNTER — OFFICE VISIT (OUTPATIENT)
Facility: CLINIC | Age: 50
End: 2025-04-02
Payer: MEDICARE

## 2025-04-02 VITALS
OXYGEN SATURATION: 96 % | RESPIRATION RATE: 18 BRPM | DIASTOLIC BLOOD PRESSURE: 80 MMHG | WEIGHT: 253 LBS | TEMPERATURE: 97.1 F | HEIGHT: 61 IN | BODY MASS INDEX: 47.77 KG/M2 | SYSTOLIC BLOOD PRESSURE: 115 MMHG | HEART RATE: 85 BPM

## 2025-04-02 DIAGNOSIS — R68.89 COLD INTOLERANCE: ICD-10-CM

## 2025-04-02 DIAGNOSIS — M54.2 NECK PAIN: Primary | ICD-10-CM

## 2025-04-02 LAB
T4 FREE SERPL-MCNC: 1.3 NG/DL (ref 0.7–1.5)
TSH SERPL DL<=0.05 MIU/L-ACNC: 1.3 UIU/ML (ref 0.36–3.74)

## 2025-04-02 PROCEDURE — 36415 COLL VENOUS BLD VENIPUNCTURE: CPT

## 2025-04-02 PROCEDURE — 3074F SYST BP LT 130 MM HG: CPT | Performed by: NURSE PRACTITIONER

## 2025-04-02 PROCEDURE — 1036F TOBACCO NON-USER: CPT | Performed by: NURSE PRACTITIONER

## 2025-04-02 PROCEDURE — 84443 ASSAY THYROID STIM HORMONE: CPT

## 2025-04-02 PROCEDURE — G8417 CALC BMI ABV UP PARAM F/U: HCPCS | Performed by: NURSE PRACTITIONER

## 2025-04-02 PROCEDURE — 99213 OFFICE O/P EST LOW 20 MIN: CPT | Performed by: NURSE PRACTITIONER

## 2025-04-02 PROCEDURE — 3079F DIAST BP 80-89 MM HG: CPT | Performed by: NURSE PRACTITIONER

## 2025-04-02 PROCEDURE — G8427 DOCREV CUR MEDS BY ELIG CLIN: HCPCS | Performed by: NURSE PRACTITIONER

## 2025-04-02 PROCEDURE — 84439 ASSAY OF FREE THYROXINE: CPT

## 2025-04-02 NOTE — PROGRESS NOTES
[address] [place] [address]               [phone]      Nicole Grimes is a 49 y.o. female and presents with Diabetes and Neck Pain (For a few months now off and on; Cold Sensation for a year now)       Assessment/Plan:    1. Neck pain  2. Cold intolerance  -     TSH; Future  -     T4, Free; Future  Endorses neck pain that started a few months ago. Patient denies trauma, but admits to resting head on hand often, usually with head turned to the left. Handouts provided for home exercises and stretches.   Experiencing cold intolerance.   Labs to check TFT today.  Patient verbalizes understanding and agreement to treatment plan.       Follow up and disposition:   Return for keep previously scheduled follow up.      Subjective:    Labs obtained prior to visit? No  Reviewed with patient? N/A    Neck pain and cold sensation     Started a few months ago  Intermittent  1-2/10, sharp   Back, middle somewhat down  Pt sits with head resting on hand often  Full ROM  Pt takes one Vicodin at night, every night which helps  No tylenol/ibuprofen or heat/ice  Denies trauma     ROS:     Review of Systems  As stated in HPI, otherwise all others negative.       The problem list was updated as a part of today's visit.  Patient Active Problem List   Diagnosis    Mild episode of recurrent major depressive disorder    Primary hypertension    Primary insomnia    Chronic pain    Iron deficiency anemia    Kidney transplant recipient    Hypercholesterolemia    Vitamin D deficiency    Edentulous    History of spina bifida    PAD (peripheral artery disease)    Neurogenic bladder    Leg length discrepancy    Type 2 diabetes mellitus with stage 3a chronic kidney disease, without long-term current use of insulin ([facility])    Class 3 severe obesity due to excess calories with serious comorbidity and body mass index (BMI) of 50.0 to 59.9 in adult    Spina bifida without hydrocephalus, unspecified spinal region

## 2025-04-02 NOTE — PROGRESS NOTES
Nicole Grimes is a 49 y.o. female (: 1975) presenting to address:    Chief Complaint   Patient presents with    Diabetes    Neck Pain     For a few months now off and on; Cold Sensation for a year now       Vitals:    25 1240   BP: 115/80   Pulse: 85   Resp: 18   Temp: 97.1 °F (36.2 °C)   SpO2: 96%       \"Have you been to the ER, urgent care clinic since your last visit?  Hospitalized since your last visit?\"    NO    “Have you seen or consulted any other health care providers outside of [facility] since your last visit?”    Yes, Podiatry        Have you had a mammogram?”   NO    No breast cancer screening on file

## 2025-04-08 DIAGNOSIS — F33.0 MILD EPISODE OF RECURRENT MAJOR DEPRESSIVE DISORDER: ICD-10-CM

## 2025-04-09 RX ORDER — DULOXETIN HYDROCHLORIDE 60 MG/1
CAPSULE, DELAYED RELEASE ORAL
Qty: 180 CAPSULE | Refills: 1 | Status: SHIPPED | OUTPATIENT
Start: 2025-04-09

## 2025-05-01 DIAGNOSIS — E78.00 HYPERCHOLESTEROLEMIA: ICD-10-CM

## 2025-05-05 RX ORDER — ATORVASTATIN CALCIUM 20 MG/1
20 TABLET, FILM COATED ORAL DAILY
Qty: 90 TABLET | Refills: 1 | Status: SHIPPED | OUTPATIENT
Start: 2025-05-05

## 2025-05-08 ENCOUNTER — HOSPITAL ENCOUNTER (OUTPATIENT)
Facility: HOSPITAL | Age: 50
Setting detail: SPECIMEN
Discharge: HOME | End: 2025-05-11
Payer: MEDICARE

## 2025-05-08 DIAGNOSIS — N18.31 TYPE 2 DIABETES MELLITUS WITH STAGE 3A CHRONIC KIDNEY DISEASE, WITHOUT LONG-TERM CURRENT USE OF INSULIN (HCC): ICD-10-CM

## 2025-05-08 DIAGNOSIS — E11.22 TYPE 2 DIABETES MELLITUS WITH STAGE 3A CHRONIC KIDNEY DISEASE, WITHOUT LONG-TERM CURRENT USE OF INSULIN (HCC): ICD-10-CM

## 2025-05-08 DIAGNOSIS — Z11.59 NEED FOR HEPATITIS C SCREENING TEST: ICD-10-CM

## 2025-05-08 LAB
ALBUMIN SERPL-MCNC: 3.1 G/DL (ref 3.4–5)
ALBUMIN/GLOB SERPL: 1 (ref 0.8–1.7)
ALP SERPL-CCNC: 143 U/L (ref 45–117)
ALT SERPL-CCNC: 12 U/L (ref 10–35)
ANION GAP SERPL CALC-SCNC: 14 MMOL/L (ref 3–18)
AST SERPL-CCNC: 11 U/L (ref 10–38)
BILIRUB SERPL-MCNC: 0.3 MG/DL (ref 0.2–1)
BUN SERPL-MCNC: 12 MG/DL (ref 6–23)
BUN/CREAT SERPL: 11 (ref 12–20)
CALCIUM SERPL-MCNC: 9.7 MG/DL (ref 8.5–10.1)
CHLORIDE SERPL-SCNC: 104 MMOL/L (ref 98–107)
CHOLEST SERPL-MCNC: 157 MG/DL
CO2 SERPL-SCNC: 26 MMOL/L (ref 21–32)
CREAT SERPL-MCNC: 1.11 MG/DL (ref 0.6–1.3)
ERYTHROCYTE [DISTWIDTH] IN BLOOD BY AUTOMATED COUNT: 17.3 % (ref 11.6–14.5)
GLOBULIN SER CALC-MCNC: 3.2 G/DL (ref 2–4)
GLUCOSE SERPL-MCNC: 106 MG/DL (ref 74–108)
HCT VFR BLD AUTO: 37.8 % (ref 35–45)
HDLC SERPL-MCNC: 35 MG/DL (ref 40–60)
HDLC SERPL: 4.5 (ref 0–5)
HGB BLD-MCNC: 11.3 G/DL (ref 12–16)
LDLC SERPL CALC-MCNC: 80 MG/DL (ref 0–100)
MCH RBC QN AUTO: 26.7 PG (ref 24–34)
MCHC RBC AUTO-ENTMCNC: 29.9 G/DL (ref 31–37)
MCV RBC AUTO: 89.2 FL (ref 78–100)
NRBC # BLD: 0 K/UL (ref 0–0.01)
NRBC BLD-RTO: 0 PER 100 WBC
PLATELET # BLD AUTO: 329 K/UL (ref 135–420)
PMV BLD AUTO: 10.7 FL (ref 9.2–11.8)
POTASSIUM SERPL-SCNC: 4.3 MMOL/L (ref 3.5–5.5)
PROT SERPL-MCNC: 6.3 G/DL (ref 6.4–8.2)
RBC # BLD AUTO: 4.24 M/UL (ref 4.2–5.3)
SODIUM SERPL-SCNC: 143 MMOL/L (ref 136–145)
TRIGL SERPL-MCNC: 212 MG/DL (ref 0–150)
VLDLC SERPL CALC-MCNC: 42 MG/DL
WBC # BLD AUTO: 9.9 K/UL (ref 4.6–13.2)

## 2025-05-08 PROCEDURE — 80053 COMPREHEN METABOLIC PANEL: CPT

## 2025-05-08 PROCEDURE — 80061 LIPID PANEL: CPT

## 2025-05-08 PROCEDURE — 36415 COLL VENOUS BLD VENIPUNCTURE: CPT

## 2025-05-08 PROCEDURE — 86803 HEPATITIS C AB TEST: CPT

## 2025-05-08 PROCEDURE — 85027 COMPLETE CBC AUTOMATED: CPT

## 2025-05-08 PROCEDURE — 83036 HEMOGLOBIN GLYCOSYLATED A1C: CPT

## 2025-05-09 LAB
EST. AVERAGE GLUCOSE BLD GHB EST-MCNC: 140 MG/DL
HBA1C MFR BLD: 6.5 % (ref 4.2–5.6)

## 2025-05-10 LAB
HCV AB SERPL QL IA: NORMAL
HCV IGG SERPL QL IA: NON REACTIVE S/CO RATIO

## 2025-05-15 ENCOUNTER — TELEMEDICINE (OUTPATIENT)
Facility: CLINIC | Age: 50
End: 2025-05-15
Payer: MEDICARE

## 2025-05-15 DIAGNOSIS — Z00.00 MEDICARE ANNUAL WELLNESS VISIT, SUBSEQUENT: Primary | ICD-10-CM

## 2025-05-15 DIAGNOSIS — I10 PRIMARY HYPERTENSION: ICD-10-CM

## 2025-05-15 DIAGNOSIS — E78.00 HYPERCHOLESTEROLEMIA: ICD-10-CM

## 2025-05-15 DIAGNOSIS — Z71.89 ADVANCED CARE PLANNING/COUNSELING DISCUSSION: ICD-10-CM

## 2025-05-15 DIAGNOSIS — E11.22 TYPE 2 DIABETES MELLITUS WITH STAGE 3A CHRONIC KIDNEY DISEASE, WITHOUT LONG-TERM CURRENT USE OF INSULIN (HCC): ICD-10-CM

## 2025-05-15 DIAGNOSIS — N18.31 TYPE 2 DIABETES MELLITUS WITH STAGE 3A CHRONIC KIDNEY DISEASE, WITHOUT LONG-TERM CURRENT USE OF INSULIN (HCC): ICD-10-CM

## 2025-05-15 PROCEDURE — G0439 PPPS, SUBSEQ VISIT: HCPCS | Performed by: NURSE PRACTITIONER

## 2025-05-15 PROCEDURE — 3017F COLORECTAL CA SCREEN DOC REV: CPT | Performed by: NURSE PRACTITIONER

## 2025-05-15 PROCEDURE — 3044F HG A1C LEVEL LT 7.0%: CPT | Performed by: NURSE PRACTITIONER

## 2025-05-15 RX ORDER — GLUCOSAMINE HCL/CHONDROITIN SU 500-400 MG
CAPSULE ORAL
Qty: 100 STRIP | Refills: 3 | Status: SHIPPED | OUTPATIENT
Start: 2025-05-15

## 2025-05-15 RX ORDER — BLOOD-GLUCOSE METER
1 KIT MISCELLANEOUS DAILY
Qty: 1 KIT | Refills: 0 | Status: SHIPPED | OUTPATIENT
Start: 2025-05-15

## 2025-05-15 RX ORDER — CETIRIZINE HYDROCHLORIDE 10 MG/1
10 TABLET ORAL DAILY
COMMUNITY

## 2025-05-15 ASSESSMENT — PATIENT HEALTH QUESTIONNAIRE - PHQ9
10. IF YOU CHECKED OFF ANY PROBLEMS, HOW DIFFICULT HAVE THESE PROBLEMS MADE IT FOR YOU TO DO YOUR WORK, TAKE CARE OF THINGS AT HOME, OR GET ALONG WITH OTHER PEOPLE: NOT DIFFICULT AT ALL
3. TROUBLE FALLING OR STAYING ASLEEP: SEVERAL DAYS
7. TROUBLE CONCENTRATING ON THINGS, SUCH AS READING THE NEWSPAPER OR WATCHING TELEVISION: NOT AT ALL
4. FEELING TIRED OR HAVING LITTLE ENERGY: SEVERAL DAYS
2. FEELING DOWN, DEPRESSED OR HOPELESS: NOT AT ALL
6. FEELING BAD ABOUT YOURSELF - OR THAT YOU ARE A FAILURE OR HAVE LET YOURSELF OR YOUR FAMILY DOWN: NOT AT ALL
1. LITTLE INTEREST OR PLEASURE IN DOING THINGS: NOT AT ALL
SUM OF ALL RESPONSES TO PHQ QUESTIONS 1-9: 2
9. THOUGHTS THAT YOU WOULD BE BETTER OFF DEAD, OR OF HURTING YOURSELF: NOT AT ALL
5. POOR APPETITE OR OVEREATING: NOT AT ALL
SUM OF ALL RESPONSES TO PHQ QUESTIONS 1-9: 2
8. MOVING OR SPEAKING SO SLOWLY THAT OTHER PEOPLE COULD HAVE NOTICED. OR THE OPPOSITE, BEING SO FIGETY OR RESTLESS THAT YOU HAVE BEEN MOVING AROUND A LOT MORE THAN USUAL: NOT AT ALL

## 2025-05-15 ASSESSMENT — LIFESTYLE VARIABLES
HOW OFTEN DO YOU HAVE A DRINK CONTAINING ALCOHOL: MONTHLY OR LESS
HOW MANY STANDARD DRINKS CONTAINING ALCOHOL DO YOU HAVE ON A TYPICAL DAY: 1 OR 2

## 2025-05-15 NOTE — PROGRESS NOTES
Nicole Grimes is a 50 y.o. female (: 1975) presenting to address:    Chief Complaint   Patient presents with    Medicare AWV       There were no vitals filed for this visit.    \"Have you been to the ER, urgent care clinic since your last visit?  Hospitalized since your last visit?\"    NO    “Have you seen or consulted any other health care providers outside of [facility] since your last visit?”    NO       Have you had a mammogram?”   NO    No breast cancer screening on file

## 2025-05-15 NOTE — PROGRESS NOTES
Medicare Annual Wellness Visit    Nicole Grimes is here for Medicare AWV    Assessment & Plan  1. Diabetes Mellitus.  - Diabetes is well-managed with an A1c level of 6.5, consistent with the previous reading.  - Currently taking Ozempic 1 mg once a week; missed dose last week but resumed schedule.  - A glucometer and supplies will be ordered for home monitoring of blood sugar levels, especially given past symptoms of hypoglycemia.  - Advised to maintain a balanced diet and engage in physical activity as tolerated.    2. Health Maintenance.  - Kidney function, liver function, thyroid levels, and blood count are all within normal ranges.  - Cholesterol levels are optimal, with a total cholesterol of 157 and LDL cholesterol of 80.  - Advised to schedule a diabetic eye exam with an ophthalmologist, as no eye exam has been done in the past year.  - Recommended to secure loose carpets at home and use non-slip mats in the shower to prevent falls.    Follow-up  - Follow up in 6 months with blood work beforehand.    Medicare annual wellness visit, subsequent  Advanced care planning/counseling discussion  Type 2 diabetes mellitus with stage 3a chronic kidney disease, without long-term current use of insulin (HCC)  -     glucose monitoring kit; DAILY Starting Thu 5/15/2025, Disp-1 kit, R-0, Normal  -     blood glucose monitor strips; Test one times a day & as needed for symptoms of irregular blood glucose. Dispense sufficient amount for indicated testing frequency plus additional to accommodate PRN testing needs., Disp-100 strip, R-3, Normal  -     Hemoglobin A1C; Future  Primary hypertension  -     Comprehensive Metabolic Panel; Future  -     CBC; Future  Hypercholesterolemia  -     Lipid Panel; Future    Results  Labs   - Total cholesterol: 157 mg/dL   - LDL cholesterol: 80 mg/dL   - A1c: 6.5%   - Kidney function: Normal   - Liver function: Normal   - Thyroid function: Normal   - Blood count: Normal             Return

## 2025-05-15 NOTE — ACP (ADVANCE CARE PLANNING)
Advance Care Planning   General Advance Care Planning (ACP) Conversation    Date of Conversation: 5/15/2025  Conducted with: Patient with Decision Making Capacity  Other persons present: None    Healthcare Decision Maker:    Primary Decision Maker: Tab Grimes - [phone]    Today we documented Decision Maker(s) consistent with Legal Next of Kin hierarchy.  Content/Action Overview:  DECLINED ACP Conversation - will revisit periodically  Reviewed DNR/DNI and patient elects Full Code (Attempt Resuscitation)  resuscitation preferences      Length of Voluntary ACP Conversation in minutes:  <16 minutes (Non-Billable)    Nicole Brooke, APRN - CNP

## 2025-05-15 NOTE — PATIENT INSTRUCTIONS
machines.)  Where would you prefer to die? (Your home? A hospital? A nursing home?)  Do you want to donate your organs when you die?  Do you want certain Spiritism practices performed before you die?  When should you call for help?  Be sure to contact your doctor if you have any questions.  Where can you learn more?  Go to https://www.[employer].net/patientEd and enter R264 to learn more about \"Advance Directives: Care Instructions.\"  Current as of: March 1, 2024  Content Version: 14.4  © 6596-9528 [email or web address].   Care instructions adapted under license by [email or web address]. If you have questions about a medical condition or this instruction, always ask your healthcare professional. [employer], [employer], disclaims any warranty or liability for your use of this information.         A Healthy Heart: Care Instructions  Overview     Coronary artery disease, also called heart disease, occurs when a substance called plaque builds up in the vessels that supply oxygen-rich blood to your heart muscle. This can narrow the blood vessels and reduce blood flow. A heart attack happens when blood flow is completely blocked. A high-fat diet, smoking, and other factors increase the risk of heart disease.  Your doctor has found that you have a chance of having heart disease. A heart-healthy lifestyle can help keep your heart healthy and prevent heart disease. This lifestyle includes eating healthy, being active, staying at a weight that's healthy for you, and not smoking or using tobacco. It also includes taking medicines as directed, managing other health conditions, and trying to get a healthy amount of sleep.  Follow-up care is a key part of your treatment and safety. Be sure to make and go to all appointments, and call your doctor if you are having problems. It's also a good idea to know your test results and keep a list of the medicines you take.  How can you care for yourself at home?  Diet    Use less salt when you cook

## 2025-08-01 DIAGNOSIS — E11.65 TYPE 2 DIABETES MELLITUS WITH HYPERGLYCEMIA, WITHOUT LONG-TERM CURRENT USE OF INSULIN (HCC): ICD-10-CM

## 2025-08-05 RX ORDER — SEMAGLUTIDE 1.34 MG/ML
INJECTION, SOLUTION SUBCUTANEOUS
Qty: 9 ML | Refills: 1 | Status: SHIPPED | OUTPATIENT
Start: 2025-08-05